# Patient Record
Sex: MALE | Race: BLACK OR AFRICAN AMERICAN | Employment: PART TIME | URBAN - METROPOLITAN AREA
[De-identification: names, ages, dates, MRNs, and addresses within clinical notes are randomized per-mention and may not be internally consistent; named-entity substitution may affect disease eponyms.]

---

## 2017-07-21 ENCOUNTER — OP HISTORICAL/CONVERTED ENCOUNTER (OUTPATIENT)
Dept: OTHER | Age: 67
End: 2017-07-21

## 2019-08-07 ENCOUNTER — OP HISTORICAL/CONVERTED ENCOUNTER (OUTPATIENT)
Dept: OTHER | Age: 69
End: 2019-08-07

## 2019-10-31 ENCOUNTER — OP HISTORICAL/CONVERTED ENCOUNTER (OUTPATIENT)
Dept: OTHER | Age: 69
End: 2019-10-31

## 2019-11-18 ENCOUNTER — OP HISTORICAL/CONVERTED ENCOUNTER (OUTPATIENT)
Dept: OTHER | Age: 69
End: 2019-11-18

## 2020-01-15 ENCOUNTER — OP HISTORICAL/CONVERTED ENCOUNTER (OUTPATIENT)
Dept: OTHER | Age: 70
End: 2020-01-15

## 2020-02-01 ENCOUNTER — OP HISTORICAL/CONVERTED ENCOUNTER (OUTPATIENT)
Dept: OTHER | Age: 70
End: 2020-02-01

## 2020-10-29 VITALS
DIASTOLIC BLOOD PRESSURE: 78 MMHG | OXYGEN SATURATION: 97 % | RESPIRATION RATE: 18 BRPM | HEIGHT: 69 IN | WEIGHT: 160 LBS | HEART RATE: 68 BPM | BODY MASS INDEX: 23.7 KG/M2 | SYSTOLIC BLOOD PRESSURE: 120 MMHG

## 2020-10-29 PROBLEM — R13.10 DYSPHAGIA: Status: ACTIVE | Noted: 2020-01-07

## 2020-10-29 PROBLEM — J31.2 CHRONIC PHARYNGITIS: Status: ACTIVE | Noted: 2020-01-07

## 2020-10-29 PROBLEM — H61.20 IMPACTED CERUMEN: Status: ACTIVE | Noted: 2020-01-07

## 2020-10-29 PROBLEM — K21.9 LARYNGOPHARYNGEAL REFLUX: Status: ACTIVE | Noted: 2020-01-07

## 2020-10-29 PROBLEM — F17.200 SMOKER: Status: ACTIVE | Noted: 2020-01-07

## 2020-10-29 RX ORDER — TRIAMTERENE/HYDROCHLOROTHIAZID 37.5-25 MG
TABLET ORAL DAILY
COMMUNITY

## 2020-10-29 RX ORDER — OMEPRAZOLE 40 MG/1
40 CAPSULE, DELAYED RELEASE ORAL DAILY
COMMUNITY

## 2020-10-29 RX ORDER — SUCRALFATE 1 G/1
1 TABLET ORAL 4 TIMES DAILY
COMMUNITY

## 2020-11-02 ENCOUNTER — TELEPHONE (OUTPATIENT)
Dept: ENT CLINIC | Age: 70
End: 2020-11-02

## 2020-11-03 ENCOUNTER — OFFICE VISIT (OUTPATIENT)
Dept: ENT CLINIC | Age: 70
End: 2020-11-03
Payer: MEDICARE

## 2020-11-03 VITALS
HEIGHT: 69 IN | SYSTOLIC BLOOD PRESSURE: 130 MMHG | HEART RATE: 77 BPM | DIASTOLIC BLOOD PRESSURE: 78 MMHG | WEIGHT: 160 LBS | RESPIRATION RATE: 18 BRPM | BODY MASS INDEX: 23.7 KG/M2 | TEMPERATURE: 97.1 F | OXYGEN SATURATION: 98 %

## 2020-11-03 DIAGNOSIS — H61.002 CHONDRODERMATITIS NODULARIS CHRONICA HELICIS, LEFT: ICD-10-CM

## 2020-11-03 DIAGNOSIS — H61.23 BILATERAL IMPACTED CERUMEN: ICD-10-CM

## 2020-11-03 DIAGNOSIS — K21.9 LARYNGOPHARYNGEAL REFLUX: Primary | ICD-10-CM

## 2020-11-03 DIAGNOSIS — F17.200 SMOKER: ICD-10-CM

## 2020-11-03 PROCEDURE — 99214 OFFICE O/P EST MOD 30 MIN: CPT | Performed by: OTOLARYNGOLOGY

## 2020-11-03 PROCEDURE — 69210 REMOVE IMPACTED EAR WAX UNI: CPT | Performed by: OTOLARYNGOLOGY

## 2020-11-03 RX ORDER — CEPHALEXIN 500 MG/1
500 CAPSULE ORAL 2 TIMES DAILY
Qty: 14 CAP | Refills: 1 | Status: SHIPPED | OUTPATIENT
Start: 2020-11-03 | End: 2020-12-01 | Stop reason: SDUPTHER

## 2020-11-03 NOTE — PROGRESS NOTES
Subjective:    Boy Goldman   77 y.o.   1950     HPI     Location - left ear    Quality - pain, swelling    Severity -  Mild/moderate    Duration - few weeks    Timing - ongoing    Context - pt with known LPR issues, follows today with few weeks of left ear \"knot\", had tried cream with Dr. Graff Hy was not helping, also c/o some irritation right ear as well but not as much    Modifying Features - cream didn't    Associated symptoms/signs - throat irritation, GERD      Review of Systems  Review of Systems   Constitutional: Negative for chills and fever. HENT: Positive for ear pain and sore throat. Negative for hearing loss, nosebleeds and tinnitus. Eyes: Negative for blurred vision and double vision. Respiratory: Negative for cough, sputum production and shortness of breath. Cardiovascular: Negative for chest pain and palpitations. Gastrointestinal: Positive for heartburn. Negative for nausea and vomiting. Musculoskeletal: Negative for joint pain and neck pain. Skin: Negative. Neurological: Negative for dizziness, speech change, weakness and headaches. Endo/Heme/Allergies: Negative for environmental allergies. Does not bruise/bleed easily. Psychiatric/Behavioral: Negative for memory loss. The patient does not have insomnia. Past Medical History:   Diagnosis Date    Chronic pharyngitis 1/7/2020    Dysphagia 1/7/2020    Hypertension     Impacted cerumen 1/7/2020    Laryngopharyngeal reflux 1/7/2020    Sinus problem     Smoker 1/7/2020     Prior to Admission medications    Medication Sig Start Date End Date Taking? Authorizing Provider   cephALEXin (KEFLEX) 500 mg capsule Take 1 Cap by mouth two (2) times a day. 11/3/20  Yes oMrelia Tavares MD   omeprazole (PRILOSEC) 40 mg capsule Take 40 mg by mouth daily. Provider, Historical   triamterene-hydroCHLOROthiazide (MAXZIDE) 37.5-25 mg per tablet Take  by mouth daily.     Provider, Historical   sucralfate (CARAFATE) 1 gram tablet Take 1 g by mouth four (4) times daily. Provider, Historical            Objective:     Visit Vitals  /78 (BP 1 Location: Left arm, BP Patient Position: Sitting)   Pulse 77   Temp 97.1 °F (36.2 °C) (Oral)   Resp 18   Ht 5' 9\" (1.753 m)   Wt 160 lb (72.6 kg)   SpO2 98%   BMI 23.63 kg/m²        Physical Exam  Vitals signs reviewed. Constitutional:       General: He is awake. Appearance: Normal appearance. He is normal weight. HENT:      Head: Normocephalic and atraumatic. Jaw: There is normal jaw occlusion. No trismus, tenderness or malocclusion. Salivary Glands: Right salivary gland is not diffusely enlarged or tender. Left salivary gland is not diffusely enlarged or tender. Right Ear: Tympanic membrane, ear canal and external ear normal. Decreased hearing noted. There is impacted cerumen. Left Ear: Tympanic membrane, ear canal and external ear normal. Decreased hearing noted. There is impacted cerumen. Ears:      Sosa exam findings: does not lateralize. Right Rinne: AC > BC. Left Rinne: AC > BC. Comments: Upper helix - chondrodermatitis with some mild sebaceous drainage; mild auricular swelling      Procedure - Removal Impacted Cerumen    Indications: cerumen impaction    Ears are examined under microscope/headlight.  bilateral ears are cleaned using otologic curette, suction, and/or alligator forceps. Nose: Rhinorrhea present. No septal deviation or mucosal edema. Right Turbinates: Not enlarged, swollen or pale. Left Turbinates: Not enlarged, swollen or pale. Right Sinus: No maxillary sinus tenderness or frontal sinus tenderness. Left Sinus: No maxillary sinus tenderness or frontal sinus tenderness. Mouth/Throat:      Lips: Pink. Mouth: Mucous membranes are moist. No oral lesions. Dentition: Normal dentition. No gum lesions. Tongue: No lesions. Palate: No mass and lesions. Pharynx: Oropharynx is clear. Uvula midline. Tonsils: No tonsillar exudate. 0 on the right. 0 on the left. Eyes:      General: Vision grossly intact. Extraocular Movements: Extraocular movements intact. Right eye: No nystagmus. Left eye: No nystagmus. Pupils: Pupils are equal, round, and reactive to light. Neck:      Musculoskeletal: Normal range of motion. No edema or erythema. Thyroid: No thyroid mass, thyromegaly or thyroid tenderness. Trachea: Trachea and phonation normal. No tracheal tenderness. Cardiovascular:      Rate and Rhythm: Normal rate and regular rhythm. Pulmonary:      Effort: Pulmonary effort is normal.      Breath sounds: Normal breath sounds. No stridor. No wheezing. Musculoskeletal: Normal range of motion. Lymphadenopathy:      Cervical: No cervical adenopathy. Skin:     General: Skin is warm and dry. Neurological:      General: No focal deficit present. Mental Status: He is alert and oriented to person, place, and time. Mental status is at baseline. Cranial Nerves: Cranial nerves are intact. Coordination: Romberg sign negative. Gait: Gait is intact. Comments: Negative Hallpike   Psychiatric:         Mood and Affect: Mood normal.         Behavior: Behavior normal. Behavior is cooperative. Assessment/Plan:     Encounter Diagnoses   Name Primary? 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     Orders Placed This Encounter    cephALEXin (KEFLEX) 500 mg capsule

## 2020-11-03 NOTE — LETTER
11/3/20 Patient: Consuelo Menezes YOB: 1950 Date of Visit: 11/3/2020 Laci Bran MD 
[address] [address] VIA Facsimile: [phone] Dear Laci Bran MD, Thank you for referring Mr. Abrahan Matthews to [facility] EAR, NOSE, THROAT AND ALLERGY CARE for evaluation. My notes for this consultation are attached. If you have questions, please do not hesitate to call me. I look forward to following your patient along with you. Sincerely, John Carter MD

## 2020-11-24 ENCOUNTER — OFFICE VISIT (OUTPATIENT)
Dept: ENT CLINIC | Age: 70
End: 2020-11-24
Payer: MEDICARE

## 2020-11-24 VITALS
WEIGHT: 160 LBS | HEART RATE: 71 BPM | HEIGHT: 69 IN | RESPIRATION RATE: 18 BRPM | TEMPERATURE: 97.5 F | BODY MASS INDEX: 23.7 KG/M2 | OXYGEN SATURATION: 98 % | SYSTOLIC BLOOD PRESSURE: 120 MMHG | DIASTOLIC BLOOD PRESSURE: 78 MMHG

## 2020-11-24 DIAGNOSIS — H61.002 CHONDRODERMATITIS NODULARIS CHRONICA HELICIS, LEFT: Primary | ICD-10-CM

## 2020-11-24 PROCEDURE — 11441 EXC FACE-MM B9+MARG 0.6-1 CM: CPT | Performed by: OTOLARYNGOLOGY

## 2020-11-24 NOTE — PROGRESS NOTES
Excision Benign Neoplasm of Skin, Auricle with Simple Closure    Informed consent was obtained. The area surrounding the left helical skin lesion was cleansed with alcohol then injected with 1.5 mL of 1% lidocaine with 1:100,000 parts epinephrine. We then prepped the area with betadine and draped in sterile fashion. A 15 blade was used to incise the skin surrounding the lesion with gross margins, down to the cartilage level. Scalpel is used to scrape any chondrodermatitis off the cartilage. The excision/defect is   1 cm. We closed the defect primarily with a single layer of running 5-0 fast absorbing plain gut. The wound was cleaned and Steri-strips and sterile pressure dressing was applied.

## 2020-11-24 NOTE — LETTER
11/24/20 Patient: Naseem Savage YOB: 1950 Date of Visit: 11/24/2020 Jennifer Farrar MD 
[address] [address] VIA Facsimile: [phone] Dear Jennifer Farrar MD, Thank you for referring Mr. Filipe Gomez to [facility] EAR, NOSE, THROAT AND ALLERGY CARE for evaluation. My notes for this consultation are attached. If you have questions, please do not hesitate to call me. I look forward to following your patient along with you. Sincerely, Sky Germain MD

## 2020-12-01 ENCOUNTER — TELEPHONE (OUTPATIENT)
Dept: ENT CLINIC | Age: 70
End: 2020-12-01

## 2020-12-01 RX ORDER — CEPHALEXIN 500 MG/1
500 CAPSULE ORAL 2 TIMES DAILY
Qty: 14 CAP | Refills: 1 | Status: SHIPPED | OUTPATIENT
Start: 2020-12-01

## 2020-12-07 LAB
CPT CODES, 490044: NORMAL
CPT DISCLAIMER: NORMAL
CYTOLOGY SPEC DOC CYTO: NORMAL
DIAGNOSIS SYNOPSIS:: NORMAL
DX ICD CODE: NORMAL
DX ICD CODE: NORMAL
PATH REPORT.GROSS SPEC: NORMAL
PATH REPORT.RELEVANT HX SPEC: NORMAL
PATHOLOGIST NAME: NORMAL
PDF IMAGE, 807507: NORMAL
SPECIMEN SOURCE: NORMAL

## 2020-12-14 ENCOUNTER — OFFICE VISIT (OUTPATIENT)
Dept: ENT CLINIC | Age: 70
End: 2020-12-14
Payer: MEDICARE

## 2020-12-14 VITALS
RESPIRATION RATE: 17 BRPM | SYSTOLIC BLOOD PRESSURE: 132 MMHG | HEIGHT: 69 IN | OXYGEN SATURATION: 98 % | HEART RATE: 72 BPM | BODY MASS INDEX: 23.7 KG/M2 | TEMPERATURE: 96.9 F | DIASTOLIC BLOOD PRESSURE: 80 MMHG | WEIGHT: 160 LBS

## 2020-12-14 DIAGNOSIS — F17.200 SMOKER: ICD-10-CM

## 2020-12-14 DIAGNOSIS — K21.9 LARYNGOPHARYNGEAL REFLUX: ICD-10-CM

## 2020-12-14 DIAGNOSIS — H61.22 IMPACTED CERUMEN OF LEFT EAR: ICD-10-CM

## 2020-12-14 DIAGNOSIS — J31.2 CHRONIC PHARYNGITIS: Primary | ICD-10-CM

## 2020-12-14 DIAGNOSIS — H61.002 CHONDRODERMATITIS NODULARIS CHRONICA HELICIS, LEFT: ICD-10-CM

## 2020-12-14 PROCEDURE — 69210 REMOVE IMPACTED EAR WAX UNI: CPT | Performed by: OTOLARYNGOLOGY

## 2020-12-14 NOTE — LETTER
12/14/20 Patient: En De Leon YOB: 1950 Date of Visit: 12/14/2020 Tracey Morrell MD 
[address] [address] VIA Facsimile: [phone] Dear Tracey Morrell MD, Thank you for referring Mr. Jian Green to [facility] EAR, NOSE, THROAT AND ALLERGY CARE for evaluation. My notes for this consultation are attached. If you have questions, please do not hesitate to call me. I look forward to following your patient along with you. Sincerely, Dandre Cobb MD

## 2020-12-14 NOTE — PROGRESS NOTES
Subjective:    Anitha Akins   72 y.o.   1950     Follow-up   Pertinent negatives include no chest pain, no headaches and no shortness of breath. Location - left ear    Quality - pain, swelling    Severity -  Mild/moderate    Duration - few weeks    Timing - ongoing    Context - pt with known LPR issues, follows today with few weeks of left ear \"knot\", had tried cream with Dr. Alonso Eis was not helping, also c/o some irritation right ear as well but not as much    Modifying Features - cream didn't    Associated symptoms/signs - throat irritation, GERD    12/14/20 - 1 mo f/u pt is doing better after ear CNH excision; he is doing better; still c/o throat mucus in morning, no pain, using PPI daily, still smokes; some cough, some PND      Review of Systems  Review of Systems   Constitutional: Negative for chills and fever. HENT: Positive for ear pain and sore throat. Negative for hearing loss, nosebleeds and tinnitus. Eyes: Negative for blurred vision and double vision. Respiratory: Negative for cough, sputum production and shortness of breath. Cardiovascular: Negative for chest pain and palpitations. Gastrointestinal: Positive for heartburn. Negative for nausea and vomiting. Musculoskeletal: Negative for joint pain and neck pain. Skin: Negative. Neurological: Negative for dizziness, speech change, weakness and headaches. Endo/Heme/Allergies: Negative for environmental allergies. Does not bruise/bleed easily. Psychiatric/Behavioral: Negative for memory loss. The patient does not have insomnia. Past Medical History:   Diagnosis Date    Chronic pharyngitis 1/7/2020    Dysphagia 1/7/2020    Hypertension     Impacted cerumen 1/7/2020    Laryngopharyngeal reflux 1/7/2020    Sinus problem     Smoker 1/7/2020     Prior to Admission medications    Medication Sig Start Date End Date Taking?  Authorizing Provider   cephALEXin (KEFLEX) 500 mg capsule Take 1 Cap by mouth two (2) times a day. 12/1/20   Tino Williamson MD   omeprazole (PRILOSEC) 40 mg capsule Take 40 mg by mouth daily. Provider, Historical   triamterene-hydroCHLOROthiazide (MAXZIDE) 37.5-25 mg per tablet Take  by mouth daily. Provider, Historical   sucralfate (CARAFATE) 1 gram tablet Take 1 g by mouth four (4) times daily. Provider, Historical            Objective:     Visit Vitals  /80 (BP 1 Location: Left arm, BP Patient Position: Sitting)   Pulse 72   Temp 96.9 °F (36.1 °C) (Oral)   Resp 17   Ht 5' 9\" (1.753 m)   Wt 160 lb (72.6 kg)   SpO2 98%   BMI 23.63 kg/m²        Physical Exam  Vitals signs reviewed. Constitutional:       General: He is awake. Appearance: Normal appearance. He is normal weight. HENT:      Head: Normocephalic and atraumatic. Jaw: There is normal jaw occlusion. No trismus, tenderness or malocclusion. Salivary Glands: Right salivary gland is not diffusely enlarged or tender. Left salivary gland is not diffusely enlarged or tender. Right Ear: Tympanic membrane, ear canal and external ear normal. Decreased hearing noted. Left Ear: Tympanic membrane, ear canal and external ear normal. Decreased hearing noted. There is impacted cerumen. Ears:      Sosa exam findings: does not lateralize. Right Rinne: AC > BC. Left Rinne: AC > BC. Comments: Upper helix - well healed excision site      Procedure - Removal Impacted Cerumen    Indications: cerumen impaction    Ears are examined under microscope/headlight. Left ears are cleaned using otologic curette, suction, and/or alligator forceps. Nose: Rhinorrhea present. No septal deviation or mucosal edema. Right Turbinates: Not enlarged, swollen or pale. Left Turbinates: Not enlarged, swollen or pale. Right Sinus: No maxillary sinus tenderness or frontal sinus tenderness. Left Sinus: No maxillary sinus tenderness or frontal sinus tenderness. Mouth/Throat:      Lips: Pink.       Mouth: Mucous membranes are moist. No oral lesions. Dentition: Normal dentition. No gum lesions. Tongue: No lesions. Palate: No mass and lesions. Pharynx: Oropharynx is clear. Uvula midline. Tonsils: No tonsillar exudate. 0 on the right. 0 on the left. Eyes:      General: Vision grossly intact. Extraocular Movements: Extraocular movements intact. Right eye: No nystagmus. Left eye: No nystagmus. Pupils: Pupils are equal, round, and reactive to light. Neck:      Musculoskeletal: Normal range of motion. No edema or erythema. Thyroid: No thyroid mass, thyromegaly or thyroid tenderness. Trachea: Trachea and phonation normal. No tracheal tenderness. Cardiovascular:      Rate and Rhythm: Normal rate and regular rhythm. Pulmonary:      Effort: Pulmonary effort is normal.      Breath sounds: Normal breath sounds. No stridor. No wheezing. Musculoskeletal: Normal range of motion. Lymphadenopathy:      Cervical: No cervical adenopathy. Skin:     General: Skin is warm and dry. Neurological:      General: No focal deficit present. Mental Status: He is alert and oriented to person, place, and time. Mental status is at baseline. Cranial Nerves: Cranial nerves are intact. Coordination: Romberg sign negative. Gait: Gait is intact. Comments: Negative Hallpike   Psychiatric:         Mood and Affect: Mood normal.         Behavior: Behavior normal. Behavior is cooperative. Assessment/Plan:     Encounter Diagnoses   Name Primary?  Chronic pharyngitis Yes    Laryngopharyngeal reflux     Chondrodermatitis nodularis chronica helicis, left     Impacted cerumen of left ear     Smoker      Stable LPR - ok to use his PPI prn  Smoking cessation discussed  Ears cleaned AS  Left ear has CNH s/p excision, path benign - healed well  Fu 6 mos       No orders of the defined types were placed in this encounter.     Follow-up and Dispositions · Return in about 6 months (around 6/14/2021).

## 2021-07-14 ENCOUNTER — OFFICE VISIT (OUTPATIENT)
Dept: ENT CLINIC | Age: 71
End: 2021-07-14
Payer: MEDICARE

## 2021-07-14 VITALS
DIASTOLIC BLOOD PRESSURE: 78 MMHG | HEIGHT: 69 IN | OXYGEN SATURATION: 98 % | WEIGHT: 160 LBS | BODY MASS INDEX: 23.7 KG/M2 | SYSTOLIC BLOOD PRESSURE: 120 MMHG | HEART RATE: 87 BPM | RESPIRATION RATE: 18 BRPM | TEMPERATURE: 97.2 F

## 2021-07-14 DIAGNOSIS — H61.23 BILATERAL IMPACTED CERUMEN: ICD-10-CM

## 2021-07-14 DIAGNOSIS — K21.9 LARYNGOPHARYNGEAL REFLUX: ICD-10-CM

## 2021-07-14 DIAGNOSIS — J31.2 CHRONIC PHARYNGITIS: Primary | ICD-10-CM

## 2021-07-14 DIAGNOSIS — F17.200 SMOKER: ICD-10-CM

## 2021-07-14 PROCEDURE — G8536 NO DOC ELDER MAL SCRN: HCPCS | Performed by: OTOLARYNGOLOGY

## 2021-07-14 PROCEDURE — 99213 OFFICE O/P EST LOW 20 MIN: CPT | Performed by: OTOLARYNGOLOGY

## 2021-07-14 PROCEDURE — 3017F COLORECTAL CA SCREEN DOC REV: CPT | Performed by: OTOLARYNGOLOGY

## 2021-07-14 PROCEDURE — 69210 REMOVE IMPACTED EAR WAX UNI: CPT | Performed by: OTOLARYNGOLOGY

## 2021-07-14 PROCEDURE — G8510 SCR DEP NEG, NO PLAN REQD: HCPCS | Performed by: OTOLARYNGOLOGY

## 2021-07-14 PROCEDURE — G8420 CALC BMI NORM PARAMETERS: HCPCS | Performed by: OTOLARYNGOLOGY

## 2021-07-14 PROCEDURE — 1101F PT FALLS ASSESS-DOCD LE1/YR: CPT | Performed by: OTOLARYNGOLOGY

## 2021-07-14 PROCEDURE — G8427 DOCREV CUR MEDS BY ELIG CLIN: HCPCS | Performed by: OTOLARYNGOLOGY

## 2021-07-14 RX ORDER — COLCHICINE 0.6 MG/1
TABLET ORAL
COMMUNITY
Start: 2021-06-20

## 2021-07-14 RX ORDER — TAMSULOSIN HYDROCHLORIDE 0.4 MG/1
CAPSULE ORAL
COMMUNITY

## 2021-07-14 RX ORDER — ALLOPURINOL 300 MG/1
TABLET ORAL
COMMUNITY
Start: 2021-07-06

## 2021-07-14 RX ORDER — DOXYCYCLINE 100 MG/1
CAPSULE ORAL
COMMUNITY
Start: 2021-05-07

## 2021-07-14 NOTE — PROGRESS NOTES
Visit Vitals  /78 (BP 1 Location: Left upper arm, BP Patient Position: Sitting, BP Cuff Size: Adult)   Pulse 87   Temp 97.2 °F (36.2 °C) (Temporal)   Resp 18   Ht 5' 9\" (1.753 m)   Wt 160 lb (72.6 kg)   SpO2 98%   BMI 23.63 kg/m²     Chief Complaint   Patient presents with    Follow-up     Chondrodermatitis nodularis chronica helicis, left

## 2021-07-14 NOTE — PROGRESS NOTES
Subjective:    Allyson Cuadra   72 y.o.   1950     Followup visit    Initial HPI  Location - left ear    Quality - pain, swelling    Severity -  Mild/moderate    Duration - few weeks    Timing - ongoing    Context - pt with known LPR issues, follows today with few weeks of left ear \"knot\", had tried cream with Dr. Roopa Howell was not helping, also c/o some irritation right ear as well but not as much    Modifying Features - cream didn't    Associated symptoms/signs - throat irritation, GERD    Followup HPI  12/14/20 - 1 mo f/u pt is doing better after ear CNH excision; he is doing better; still c/o throat mucus in morning, no pain, using PPI daily, still smokes; some cough, some PND    7/14/21 - 7 mos f/u pt still c/o mucus in the morning; only using PPI prn, not c/o much LPR; does endorse some PND; does c/o effortful swallow at times      Review of Systems  Review of Systems   Constitutional: Negative for chills and fever. HENT: Positive for ear pain and sore throat. Negative for hearing loss, nosebleeds and tinnitus. Eyes: Negative for blurred vision and double vision. Respiratory: Negative for cough, sputum production and shortness of breath. Cardiovascular: Negative for chest pain and palpitations. Gastrointestinal: Positive for heartburn. Negative for nausea and vomiting. Musculoskeletal: Negative for joint pain and neck pain. Skin: Negative. Neurological: Negative for dizziness, speech change, weakness and headaches. Endo/Heme/Allergies: Negative for environmental allergies. Does not bruise/bleed easily. Psychiatric/Behavioral: Negative for memory loss. The patient does not have insomnia.           Past Medical History:   Diagnosis Date    Chronic pharyngitis 1/7/2020    Dysphagia 1/7/2020    Hypertension     Impacted cerumen 1/7/2020    Laryngopharyngeal reflux 1/7/2020    Sinus problem     Smoker 1/7/2020     Prior to Admission medications    Medication Sig Start Date End Date Taking? Authorizing Provider   allopurinoL (ZYLOPRIM) 300 mg tablet  7/6/21   Provider, Historical   doxycycline (VIBRAMYCIN) 100 mg capsule  5/7/21   Provider, Historical   colchicine 0.6 mg tablet  6/20/21   Provider, Historical   tamsulosin (FLOMAX) 0.4 mg capsule tamsulosin 0.4 mg capsule    Provider, Historical   cephALEXin (KEFLEX) 500 mg capsule Take 1 Cap by mouth two (2) times a day. 12/1/20   Tuan Mccarty MD   omeprazole (PRILOSEC) 40 mg capsule Take 40 mg by mouth daily. Provider, Historical   triamterene-hydroCHLOROthiazide (MAXZIDE) 37.5-25 mg per tablet Take  by mouth daily. Provider, Historical   sucralfate (CARAFATE) 1 gram tablet Take 1 g by mouth four (4) times daily. Provider, Historical            Objective:     Visit Vitals  /78 (BP 1 Location: Left upper arm, BP Patient Position: Sitting, BP Cuff Size: Adult)   Pulse 87   Temp 97.2 °F (36.2 °C) (Temporal)   Resp 18   Ht 5' 9\" (1.753 m)   Wt 160 lb (72.6 kg)   SpO2 98%   BMI 23.63 kg/m²        Physical Exam  Vitals reviewed. Constitutional:       General: He is awake. Appearance: Normal appearance. He is normal weight. HENT:      Head: Normocephalic and atraumatic. Jaw: There is normal jaw occlusion. No trismus, tenderness or malocclusion. Salivary Glands: Right salivary gland is not diffusely enlarged or tender. Left salivary gland is not diffusely enlarged or tender. Right Ear: Tympanic membrane, ear canal and external ear normal. Decreased hearing noted. There is impacted cerumen. Left Ear: Tympanic membrane, ear canal and external ear normal. Decreased hearing noted. There is impacted cerumen. Ears:      Sosa exam findings: does not lateralize. Right Rinne: AC > BC. Left Rinne: AC > BC. Nose: Rhinorrhea present. No septal deviation or mucosal edema. Right Turbinates: Not enlarged, swollen or pale. Left Turbinates: Not enlarged, swollen or pale.       Right Sinus: No maxillary sinus tenderness or frontal sinus tenderness. Left Sinus: No maxillary sinus tenderness or frontal sinus tenderness. Mouth/Throat:      Lips: Pink. Mouth: Mucous membranes are moist. No oral lesions. Dentition: Normal dentition. No gum lesions. Tongue: No lesions. Palate: No mass and lesions. Pharynx: Oropharynx is clear. Uvula midline. Tonsils: No tonsillar exudate. 0 on the right. 0 on the left. Eyes:      General: Vision grossly intact. Extraocular Movements: Extraocular movements intact. Right eye: No nystagmus. Left eye: No nystagmus. Pupils: Pupils are equal, round, and reactive to light. Neck:      Thyroid: No thyroid mass, thyromegaly or thyroid tenderness. Trachea: Trachea and phonation normal. No tracheal tenderness. Cardiovascular:      Rate and Rhythm: Normal rate and regular rhythm. Pulmonary:      Effort: Pulmonary effort is normal.      Breath sounds: Normal breath sounds. No stridor. No wheezing. Musculoskeletal:         General: Normal range of motion. Cervical back: Normal range of motion. No edema or erythema. Lymphadenopathy:      Cervical: No cervical adenopathy. Skin:     General: Skin is warm and dry. Neurological:      General: No focal deficit present. Mental Status: He is alert and oriented to person, place, and time. Mental status is at baseline. Cranial Nerves: Cranial nerves are intact. Coordination: Romberg sign negative. Gait: Gait is intact. Psychiatric:         Mood and Affect: Mood normal.         Behavior: Behavior normal. Behavior is cooperative. Procedure - Removal Impacted Cerumen    Indications: cerumen impaction    Ears are examined under microscope/headlight.  bilateral ears are cleaned using otologic curette, suction, and/or alligator forceps. Assessment/Plan:     Encounter Diagnoses   Name Primary?     Chronic pharyngitis Yes    Laryngopharyngeal reflux     Bilateral impacted cerumen     Smoker      Stable LPR - ok to use his PPI prn  Smoking cessation discussed  Ears cleaned AU    Fu 6 mos       Orders Placed This Encounter    REMOVE IMPACTED EAR WAX    allopurinoL (ZYLOPRIM) 300 mg tablet    doxycycline (VIBRAMYCIN) 100 mg capsule    colchicine 0.6 mg tablet    tamsulosin (FLOMAX) 0.4 mg capsule     Follow-up and Dispositions    · Return in about 6 months (around 1/14/2022).

## 2021-12-09 ENCOUNTER — TELEPHONE (OUTPATIENT)
Dept: ENT CLINIC | Age: 71
End: 2021-12-09

## 2022-03-18 PROBLEM — J31.2 CHRONIC PHARYNGITIS: Status: ACTIVE | Noted: 2020-01-07

## 2022-03-18 PROBLEM — R13.10 DYSPHAGIA: Status: ACTIVE | Noted: 2020-01-07

## 2022-03-19 PROBLEM — H61.002: Status: ACTIVE | Noted: 2020-11-03

## 2022-03-19 PROBLEM — F17.200 SMOKER: Status: ACTIVE | Noted: 2020-01-07

## 2022-03-19 PROBLEM — K21.9 LARYNGOPHARYNGEAL REFLUX: Status: ACTIVE | Noted: 2020-01-07

## 2022-03-20 PROBLEM — H61.20 IMPACTED CERUMEN: Status: ACTIVE | Noted: 2020-01-07

## 2022-07-12 ENCOUNTER — TELEPHONE (OUTPATIENT)
Dept: ENT CLINIC | Age: 72
End: 2022-07-12

## 2022-07-27 ENCOUNTER — TELEPHONE (OUTPATIENT)
Dept: ENT CLINIC | Age: 72
End: 2022-07-27

## 2022-07-27 ENCOUNTER — OFFICE VISIT (OUTPATIENT)
Dept: ENT CLINIC | Age: 72
End: 2022-07-27
Payer: MEDICARE

## 2022-07-27 VITALS
HEIGHT: 69 IN | SYSTOLIC BLOOD PRESSURE: 130 MMHG | HEART RATE: 74 BPM | BODY MASS INDEX: 23.7 KG/M2 | DIASTOLIC BLOOD PRESSURE: 80 MMHG | RESPIRATION RATE: 18 BRPM | WEIGHT: 160 LBS | OXYGEN SATURATION: 99 %

## 2022-07-27 DIAGNOSIS — K21.9 LARYNGOPHARYNGEAL REFLUX: ICD-10-CM

## 2022-07-27 DIAGNOSIS — J31.2 CHRONIC PHARYNGITIS: Primary | ICD-10-CM

## 2022-07-27 DIAGNOSIS — H61.23 BILATERAL IMPACTED CERUMEN: ICD-10-CM

## 2022-07-27 PROCEDURE — G8427 DOCREV CUR MEDS BY ELIG CLIN: HCPCS | Performed by: OTOLARYNGOLOGY

## 2022-07-27 PROCEDURE — 99214 OFFICE O/P EST MOD 30 MIN: CPT | Performed by: OTOLARYNGOLOGY

## 2022-07-27 PROCEDURE — 1123F ACP DISCUSS/DSCN MKR DOCD: CPT | Performed by: OTOLARYNGOLOGY

## 2022-07-27 PROCEDURE — G8420 CALC BMI NORM PARAMETERS: HCPCS | Performed by: OTOLARYNGOLOGY

## 2022-07-27 PROCEDURE — 69210 REMOVE IMPACTED EAR WAX UNI: CPT | Performed by: OTOLARYNGOLOGY

## 2022-07-27 PROCEDURE — G8510 SCR DEP NEG, NO PLAN REQD: HCPCS | Performed by: OTOLARYNGOLOGY

## 2022-07-27 PROCEDURE — G8536 NO DOC ELDER MAL SCRN: HCPCS | Performed by: OTOLARYNGOLOGY

## 2022-07-27 PROCEDURE — 1101F PT FALLS ASSESS-DOCD LE1/YR: CPT | Performed by: OTOLARYNGOLOGY

## 2022-07-27 PROCEDURE — 3017F COLORECTAL CA SCREEN DOC REV: CPT | Performed by: OTOLARYNGOLOGY

## 2022-07-27 NOTE — PROGRESS NOTES
Subjective:    Valente Ballesteros   80 y.o.   1950     Followup visit    Initial HPI  Location - left ear    Quality - pain, swelling    Severity -  Mild/moderate    Duration - few weeks    Timing - ongoing    Context - pt with known LPR issues, follows today with few weeks of left ear \"knot\", had tried cream with Dr. 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 He still is on reflux meds    Review of Systems  Review of Systems   Constitutional:  Negative for chills and fever. HENT:  Positive for ear pain and sore throat. Negative for hearing loss, nosebleeds and tinnitus. Eyes:  Negative for blurred vision and double vision. Respiratory:  Negative for cough, sputum production and shortness of breath. Cardiovascular:  Negative for chest pain and palpitations. Gastrointestinal:  Positive for heartburn. Negative for nausea and vomiting. Musculoskeletal:  Negative for joint pain and neck pain. Skin: Negative. Neurological:  Negative for dizziness, speech change, weakness and headaches. Endo/Heme/Allergies:  Negative for environmental allergies. Does not bruise/bleed easily. Psychiatric/Behavioral:  Negative for memory loss. The patient does not have insomnia.         Past Medical History:   Diagnosis Date    Chronic pharyngitis 1/7/2020    Dysphagia 1/7/2020    Hypertension     Impacted cerumen 1/7/2020    Laryngopharyngeal reflux 1/7/2020    Sinus problem Smoker 1/7/2020     Prior to Admission medications    Medication Sig Start Date End Date Taking? Authorizing Provider   allopurinoL (ZYLOPRIM) 300 mg tablet  7/6/21   Provider, Historical   doxycycline (VIBRAMYCIN) 100 mg capsule  5/7/21   Provider, Historical   colchicine 0.6 mg tablet  6/20/21   Provider, Historical   tamsulosin (FLOMAX) 0.4 mg capsule tamsulosin 0.4 mg capsule    Provider, Historical   cephALEXin (KEFLEX) 500 mg capsule Take 1 Cap by mouth two (2) times a day. 12/1/20   Nishant President, MD   omeprazole (PRILOSEC) 40 mg capsule Take 40 mg by mouth daily. Provider, Historical   triamterene-hydroCHLOROthiazide (MAXZIDE) 37.5-25 mg per tablet Take  by mouth daily. Provider, Historical   sucralfate (CARAFATE) 1 gram tablet Take 1 g by mouth four (4) times daily. Provider, Historical            Objective:     Visit Vitals  /80 (BP 1 Location: Left upper arm, BP Patient Position: Sitting, BP Cuff Size: Adult)   Pulse 74   Resp 18   Ht 5' 9\" (1.753 m)   Wt 160 lb (72.6 kg)   SpO2 99%   BMI 23.63 kg/m²        Physical Exam  Vitals reviewed. Constitutional:       General: He is awake. Appearance: Normal appearance. He is normal weight. HENT:      Head: Normocephalic and atraumatic. Jaw: There is normal jaw occlusion. No trismus, tenderness or malocclusion. Salivary Glands: Right salivary gland is not diffusely enlarged or tender. Left salivary gland is not diffusely enlarged or tender. Right Ear: Tympanic membrane, ear canal and external ear normal. Decreased hearing noted. There is impacted cerumen. Left Ear: Tympanic membrane, ear canal and external ear normal. Decreased hearing noted. There is impacted cerumen. Nose: Rhinorrhea present. No septal deviation or mucosal edema. Right Turbinates: Not enlarged, swollen or pale. Left Turbinates: Not enlarged, swollen or pale. Right Sinus: No maxillary sinus tenderness or frontal sinus tenderness. Left Sinus: No maxillary sinus tenderness or frontal sinus tenderness. Mouth/Throat:      Lips: Pink. Mouth: Mucous membranes are moist. No oral lesions. Dentition: Normal dentition. No gum lesions. Tongue: No lesions. Palate: No mass and lesions. Pharynx: Oropharynx is clear. Uvula midline. Tonsils: No tonsillar exudate. 0 on the right. 0 on the left. Eyes:      General: Vision grossly intact. Extraocular Movements: Extraocular movements intact. Right eye: No nystagmus. Left eye: No nystagmus. Pupils: Pupils are equal, round, and reactive to light. Neck:      Thyroid: No thyroid mass, thyromegaly or thyroid tenderness. Trachea: Trachea and phonation normal. No tracheal tenderness. Cardiovascular:      Rate and Rhythm: Normal rate and regular rhythm. Pulmonary:      Effort: Pulmonary effort is normal.      Breath sounds: Normal breath sounds. No stridor. No wheezing. Musculoskeletal:         General: Normal range of motion. Cervical back: Normal range of motion. No edema or erythema. Lymphadenopathy:      Cervical: No cervical adenopathy. Skin:     General: Skin is warm and dry. Neurological:      General: No focal deficit present. Mental Status: He is alert and oriented to person, place, and time. Mental status is at baseline. Cranial Nerves: Cranial nerves are intact. Coordination: Romberg sign negative. Gait: Gait is intact. Psychiatric:         Mood and Affect: Mood normal.         Behavior: Behavior normal. Behavior is cooperative. Procedure - Removal Impacted Cerumen    Indications: cerumen impaction    Ears are examined under microscope/headlight.  bilateral ears are cleaned using otologic curette, suction, and/or alligator forceps. Assessment/Plan:     Encounter Diagnoses   Name Primary?     Chronic pharyngitis Yes    Laryngopharyngeal reflux     Bilateral impacted cerumen      Stable LPR - ok to use his PPI prn  Smoking cessation discussed  Ears cleaned AU    Fu 6 mos       Orders Placed This Encounter    REMOVE IMPACTED EAR WAX       Follow-up and Dispositions    Return in about 6 months (around 1/27/2023).

## 2022-07-27 NOTE — TELEPHONE ENCOUNTER
Pt called stating he was having sudden hearing loss in his left ear and would like to make an appointment. Where is the best place to schedule this pt?

## 2023-02-07 ENCOUNTER — TRANSCRIBE ORDER (OUTPATIENT)
Dept: SCHEDULING | Age: 73
End: 2023-02-07

## 2023-02-07 DIAGNOSIS — J31.0 CHRONIC RHINITIS: ICD-10-CM

## 2023-02-07 DIAGNOSIS — R05.3 COUGH, PERSISTENT: ICD-10-CM

## 2023-02-07 DIAGNOSIS — K21.9 LARYNGOPHARYNGEAL REFLUX: ICD-10-CM

## 2023-02-07 DIAGNOSIS — Z00.8 OTHER SPECIFIED GENERAL MEDICAL EXAMINATION: Primary | ICD-10-CM

## 2023-02-15 ENCOUNTER — TRANSCRIBE ORDER (OUTPATIENT)
Dept: SCHEDULING | Age: 73
End: 2023-02-15

## 2023-02-15 DIAGNOSIS — R05.3 COUGH, PERSISTENT: Primary | ICD-10-CM

## 2023-02-15 DIAGNOSIS — J31.0 CHRONIC RHINITIS: ICD-10-CM

## 2023-02-15 DIAGNOSIS — K21.9 LARYNGOPHARYNGEAL REFLUX: ICD-10-CM

## 2023-02-24 ENCOUNTER — HOSPITAL ENCOUNTER (OUTPATIENT)
Dept: GENERAL RADIOLOGY | Age: 73
Discharge: HOME | End: 2023-02-24
Payer: MEDICARE

## 2023-02-24 DIAGNOSIS — J31.0 CHRONIC RHINITIS: ICD-10-CM

## 2023-02-24 DIAGNOSIS — K21.9 LARYNGOPHARYNGEAL REFLUX: ICD-10-CM

## 2023-02-24 DIAGNOSIS — R05.3 COUGH, PERSISTENT: ICD-10-CM

## 2023-02-24 PROCEDURE — 74011000250 HC RX REV CODE- 250: Performed by: OTOLARYNGOLOGY

## 2023-02-24 PROCEDURE — 74220 X-RAY XM ESOPHAGUS 1CNTRST: CPT

## 2023-02-24 RX ADMIN — BARIUM SULFATE 100 ML: 0.6 SUSPENSION ORAL at 09:33

## 2023-02-24 RX ADMIN — BARIUM SULFATE 700 MG: 700 TABLET ORAL at 09:33

## 2023-05-17 RX ORDER — CEPHALEXIN 500 MG/1
500 CAPSULE ORAL 2 TIMES DAILY
COMMUNITY
Start: 2020-12-01

## 2023-05-17 RX ORDER — SUCRALFATE 1 G/1
1 TABLET ORAL 4 TIMES DAILY
COMMUNITY

## 2023-05-17 RX ORDER — DOXYCYCLINE HYCLATE 100 MG/1
CAPSULE ORAL
COMMUNITY
Start: 2021-05-07

## 2023-05-17 RX ORDER — COLCHICINE 0.6 MG/1
TABLET ORAL
COMMUNITY
Start: 2021-06-20

## 2023-05-17 RX ORDER — TAMSULOSIN HYDROCHLORIDE 0.4 MG/1
CAPSULE ORAL
COMMUNITY

## 2023-05-17 RX ORDER — OMEPRAZOLE 40 MG/1
40 CAPSULE, DELAYED RELEASE ORAL DAILY
COMMUNITY

## 2023-05-17 RX ORDER — ALLOPURINOL 300 MG/1
TABLET ORAL
COMMUNITY
Start: 2021-07-06

## 2023-05-17 RX ORDER — TRIAMTERENE AND HYDROCHLOROTHIAZIDE 37.5; 25 MG/1; MG/1
TABLET ORAL DAILY
COMMUNITY

## 2023-06-26 ENCOUNTER — HOSPITAL ENCOUNTER (OUTPATIENT)
Facility: HOSPITAL | Age: 73
Setting detail: OUTPATIENT SURGERY
Discharge: HOME | End: 2023-06-26
Attending: INTERNAL MEDICINE | Admitting: INTERNAL MEDICINE
Payer: MEDICARE

## 2023-06-26 ENCOUNTER — ANESTHESIA EVENT (OUTPATIENT)
Facility: HOSPITAL | Age: 73
End: 2023-06-26
Payer: MEDICARE

## 2023-06-26 ENCOUNTER — ANESTHESIA (OUTPATIENT)
Facility: HOSPITAL | Age: 73
End: 2023-06-26
Payer: MEDICARE

## 2023-06-26 VITALS
DIASTOLIC BLOOD PRESSURE: 89 MMHG | SYSTOLIC BLOOD PRESSURE: 121 MMHG | RESPIRATION RATE: 18 BRPM | TEMPERATURE: 97.4 F | HEART RATE: 74 BPM | OXYGEN SATURATION: 98 %

## 2023-06-26 DIAGNOSIS — K44.9 HIATAL HERNIA: ICD-10-CM

## 2023-06-26 DIAGNOSIS — R13.10 DYSPHAGIA, UNSPECIFIED TYPE: ICD-10-CM

## 2023-06-26 DIAGNOSIS — K21.9 GASTROESOPHAGEAL REFLUX DISEASE, UNSPECIFIED WHETHER ESOPHAGITIS PRESENT: ICD-10-CM

## 2023-06-26 DIAGNOSIS — K30 DYSPEPSIA DUE TO DYSMOTILITY: ICD-10-CM

## 2023-06-26 PROCEDURE — 3700000001 HC ADD 15 MINUTES (ANESTHESIA): Performed by: INTERNAL MEDICINE

## 2023-06-26 PROCEDURE — 6360000002 HC RX W HCPCS: Performed by: NURSE ANESTHETIST, CERTIFIED REGISTERED

## 2023-06-26 PROCEDURE — 2580000003 HC RX 258: Performed by: INTERNAL MEDICINE

## 2023-06-26 PROCEDURE — 7100000010 HC PHASE II RECOVERY - FIRST 15 MIN: Performed by: INTERNAL MEDICINE

## 2023-06-26 PROCEDURE — 3600007502: Performed by: INTERNAL MEDICINE

## 2023-06-26 PROCEDURE — 3700000000 HC ANESTHESIA ATTENDED CARE: Performed by: INTERNAL MEDICINE

## 2023-06-26 PROCEDURE — 7100000011 HC PHASE II RECOVERY - ADDTL 15 MIN: Performed by: INTERNAL MEDICINE

## 2023-06-26 PROCEDURE — 88305 TISSUE EXAM BY PATHOLOGIST: CPT

## 2023-06-26 PROCEDURE — 3600007512: Performed by: INTERNAL MEDICINE

## 2023-06-26 PROCEDURE — 2709999900 HC NON-CHARGEABLE SUPPLY: Performed by: INTERNAL MEDICINE

## 2023-06-26 PROCEDURE — 2500000003 HC RX 250 WO HCPCS: Performed by: NURSE ANESTHETIST, CERTIFIED REGISTERED

## 2023-06-26 RX ORDER — SODIUM CHLORIDE, SODIUM LACTATE, POTASSIUM CHLORIDE, CALCIUM CHLORIDE 600; 310; 30; 20 MG/100ML; MG/100ML; MG/100ML; MG/100ML
INJECTION, SOLUTION INTRAVENOUS CONTINUOUS
Status: DISCONTINUED | OUTPATIENT
Start: 2023-06-26 | End: 2023-06-26 | Stop reason: HOSPADM

## 2023-06-26 RX ORDER — GLYCOPYRROLATE 0.2 MG/ML
INJECTION INTRAMUSCULAR; INTRAVENOUS PRN
Status: DISCONTINUED | OUTPATIENT
Start: 2023-06-26 | End: 2023-06-26 | Stop reason: SDUPTHER

## 2023-06-26 RX ORDER — PROPOFOL 10 MG/ML
INJECTION, EMULSION INTRAVENOUS PRN
Status: DISCONTINUED | OUTPATIENT
Start: 2023-06-26 | End: 2023-06-26 | Stop reason: SDUPTHER

## 2023-06-26 RX ORDER — LIDOCAINE HYDROCHLORIDE 20 MG/ML
INJECTION, SOLUTION EPIDURAL; INFILTRATION; INTRACAUDAL; PERINEURAL PRN
Status: DISCONTINUED | OUTPATIENT
Start: 2023-06-26 | End: 2023-06-26 | Stop reason: SDUPTHER

## 2023-06-26 RX ADMIN — PROPOFOL 150 MG: 10 INJECTION, EMULSION INTRAVENOUS at 12:33

## 2023-06-26 RX ADMIN — GLYCOPYRROLATE 0.2 MG: 0.2 INJECTION, SOLUTION INTRAMUSCULAR; INTRAVENOUS at 12:31

## 2023-06-26 RX ADMIN — PROPOFOL 50 MG: 10 INJECTION, EMULSION INTRAVENOUS at 12:35

## 2023-06-26 RX ADMIN — LIDOCAINE HYDROCHLORIDE 100 MG: 20 INJECTION, SOLUTION EPIDURAL; INFILTRATION; INTRACAUDAL; PERINEURAL at 12:33

## 2023-06-26 RX ADMIN — SODIUM CHLORIDE, POTASSIUM CHLORIDE, SODIUM LACTATE AND CALCIUM CHLORIDE: 600; 310; 30; 20 INJECTION, SOLUTION INTRAVENOUS at 10:57

## 2023-06-26 ASSESSMENT — PAIN - FUNCTIONAL ASSESSMENT: PAIN_FUNCTIONAL_ASSESSMENT: NONE - DENIES PAIN

## 2023-06-26 NOTE — ANESTHESIA PRE PROCEDURE
results found for: PHART, PO2ART, NBI2RXR, BYR2WIU, BEART, D8UBYVLZ     Type & Screen (If Applicable):  No results found for: LABABO, LABRH    Drug/Infectious Status (If Applicable):  No results found for: HIV, HEPCAB    COVID-19 Screening (If Applicable): No results found for: COVID19        Anesthesia Evaluation  Patient summary reviewed and Nursing notes reviewed no history of anesthetic complications:   Airway: Mallampati: II  TM distance: >3 FB   Neck ROM: full  Mouth opening: > = 3 FB   Dental: normal exam         Pulmonary:Negative Pulmonary ROS and normal exam  breath sounds clear to auscultation                             Cardiovascular:    (+) hypertension:,         Rhythm: regular  Rate: normal                    Neuro/Psych:   Negative Neuro/Psych ROS              GI/Hepatic/Renal:   (+) GERD:,           Endo/Other:    (+) : arthritis:., .                  ROS comment: Gout Abdominal:              PE comment: Deferred. Vascular: negative vascular ROS. Other Findings:           Anesthesia Plan      MAC and TIVA     ASA 2     (Standard ASA monitors: continuous EKG, BP, HR, pulse oximeter, and capnography.)  Induction: intravenous. continuous noninvasive hemodynamic monitor  MIPS: Prophylactic antiemetics administered. Anesthetic plan and risks discussed with patient (and family, if present. ). Plan discussed with CRNA.     Attending anesthesiologist reviewed and agrees with Joshua Lazar MD   6/26/2023

## 2023-06-26 NOTE — ANESTHESIA POSTPROCEDURE EVALUATION
Department of Anesthesiology  Postprocedure Note    Patient: Priti Cifuentes  MRN: [id number]  YOB: 1950  Date of evaluation: 6/26/2023      Procedure Summary     Date: 06/26/23 Room / Location: [facility] ENDO 02 / [facility] ENDOSCOPY    Anesthesia Start: 5491 Anesthesia Stop: 6289    Procedure: EGD WITH BIOPSY/DILATION (Upper GI Region) Diagnosis:       Gastroesophageal reflux disease, unspecified whether esophagitis present      Hiatal hernia      Dyspepsia due to dysmotility      Dysphagia, unspecified type      (Gastroesophageal reflux disease, unspecified whether esophagitis present [K21.9])      (Hiatal hernia [K44.9])      (Dyspepsia due to dysmotility [K30])      (Dysphagia, unspecified type [R13.10])    Surgeons: Suleiman Scales MD Responsible Provider: Suha Mcgraw MD    Anesthesia Type: MAC ASA Status: 2          Anesthesia Type: MAC    Colleen Phase I: Colleen Score: 10    Colleen Phase II:        Anesthesia Post Evaluation    Patient location during evaluation: bedside  Patient participation: complete - patient participated  Level of consciousness: sleepy but conscious  Pain score: 0  Airway patency: patent  Nausea & Vomiting: no vomiting and no nausea  Complications: no  Cardiovascular status: blood pressure returned to baseline  Respiratory status: acceptable  Hydration status: stable

## 2023-08-07 ENCOUNTER — ANESTHESIA (OUTPATIENT)
Facility: HOSPITAL | Age: 73
End: 2023-08-07
Payer: MEDICARE

## 2023-08-07 ENCOUNTER — HOSPITAL ENCOUNTER (OUTPATIENT)
Facility: HOSPITAL | Age: 73
Setting detail: OUTPATIENT SURGERY
Discharge: HOME | End: 2023-08-07
Attending: INTERNAL MEDICINE | Admitting: INTERNAL MEDICINE
Payer: MEDICARE

## 2023-08-07 ENCOUNTER — ANESTHESIA EVENT (OUTPATIENT)
Facility: HOSPITAL | Age: 73
End: 2023-08-07
Payer: MEDICARE

## 2023-08-07 VITALS
WEIGHT: 195 LBS | TEMPERATURE: 97.2 F | RESPIRATION RATE: 18 BRPM | SYSTOLIC BLOOD PRESSURE: 147 MMHG | BODY MASS INDEX: 27.92 KG/M2 | HEART RATE: 78 BPM | DIASTOLIC BLOOD PRESSURE: 92 MMHG | HEIGHT: 70 IN | OXYGEN SATURATION: 100 %

## 2023-08-07 DIAGNOSIS — R19.4 CHANGE IN BOWEL HABIT: ICD-10-CM

## 2023-08-07 DIAGNOSIS — Z86.010 HX OF COLONIC POLYP: ICD-10-CM

## 2023-08-07 PROCEDURE — 2709999900 HC NON-CHARGEABLE SUPPLY: Performed by: INTERNAL MEDICINE

## 2023-08-07 PROCEDURE — 2580000003 HC RX 258: Performed by: NURSE ANESTHETIST, CERTIFIED REGISTERED

## 2023-08-07 PROCEDURE — 2500000003 HC RX 250 WO HCPCS: Performed by: NURSE ANESTHETIST, CERTIFIED REGISTERED

## 2023-08-07 PROCEDURE — 3700000001 HC ADD 15 MINUTES (ANESTHESIA): Performed by: INTERNAL MEDICINE

## 2023-08-07 PROCEDURE — 6360000002 HC RX W HCPCS: Performed by: NURSE ANESTHETIST, CERTIFIED REGISTERED

## 2023-08-07 PROCEDURE — 88305 TISSUE EXAM BY PATHOLOGIST: CPT

## 2023-08-07 PROCEDURE — 7100000010 HC PHASE II RECOVERY - FIRST 15 MIN: Performed by: INTERNAL MEDICINE

## 2023-08-07 PROCEDURE — 3600007502: Performed by: INTERNAL MEDICINE

## 2023-08-07 PROCEDURE — 2580000003 HC RX 258: Performed by: INTERNAL MEDICINE

## 2023-08-07 PROCEDURE — 3700000000 HC ANESTHESIA ATTENDED CARE: Performed by: INTERNAL MEDICINE

## 2023-08-07 PROCEDURE — 3600007512: Performed by: INTERNAL MEDICINE

## 2023-08-07 PROCEDURE — 7100000011 HC PHASE II RECOVERY - ADDTL 15 MIN: Performed by: INTERNAL MEDICINE

## 2023-08-07 RX ORDER — SODIUM CHLORIDE, SODIUM LACTATE, POTASSIUM CHLORIDE, CALCIUM CHLORIDE 600; 310; 30; 20 MG/100ML; MG/100ML; MG/100ML; MG/100ML
INJECTION, SOLUTION INTRAVENOUS CONTINUOUS PRN
Status: DISCONTINUED | OUTPATIENT
Start: 2023-08-07 | End: 2023-08-07 | Stop reason: SDUPTHER

## 2023-08-07 RX ORDER — SODIUM CHLORIDE, SODIUM LACTATE, POTASSIUM CHLORIDE, CALCIUM CHLORIDE 600; 310; 30; 20 MG/100ML; MG/100ML; MG/100ML; MG/100ML
INJECTION, SOLUTION INTRAVENOUS CONTINUOUS
Status: DISCONTINUED | OUTPATIENT
Start: 2023-08-07 | End: 2023-08-07 | Stop reason: HOSPADM

## 2023-08-07 RX ADMIN — PROPOFOL 100 MG: 10 INJECTION, EMULSION INTRAVENOUS at 11:58

## 2023-08-07 RX ADMIN — SODIUM CHLORIDE, POTASSIUM CHLORIDE, SODIUM LACTATE AND CALCIUM CHLORIDE: 600; 310; 30; 20 INJECTION, SOLUTION INTRAVENOUS at 11:56

## 2023-08-07 RX ADMIN — PROPOFOL 20 MG: 10 INJECTION, EMULSION INTRAVENOUS at 12:00

## 2023-08-07 RX ADMIN — SODIUM CHLORIDE, POTASSIUM CHLORIDE, SODIUM LACTATE AND CALCIUM CHLORIDE: 600; 310; 30; 20 INJECTION, SOLUTION INTRAVENOUS at 10:40

## 2023-08-07 RX ADMIN — LIDOCAINE HYDROCHLORIDE 100 MG: 20 INJECTION, SOLUTION EPIDURAL; INFILTRATION; INTRACAUDAL; PERINEURAL at 11:58

## 2023-08-07 ASSESSMENT — PAIN SCALES - GENERAL: PAINLEVEL_OUTOF10: 0

## 2023-08-07 ASSESSMENT — PAIN - FUNCTIONAL ASSESSMENT: PAIN_FUNCTIONAL_ASSESSMENT: 0-10

## 2023-08-07 NOTE — ANESTHESIA POSTPROCEDURE EVALUATION
Department of Anesthesiology  Postprocedure Note    Patient: Alessia Florence  MRN: [id number]  YOB: 1950  Date of evaluation: 8/7/2023      Procedure Summary     Date: 08/07/23 Room / Location: [facility] ENDO 03 / [facility] ENDOSCOPY    Anesthesia Start: 1156 Anesthesia Stop: 1210    Procedure: COLONOSCOPY DIAGNOSTIC (Lower GI Region) Diagnosis:       Change in bowel habit      Hx of colonic polyp      (Change in bowel habit [R19.4])      (Hx of colonic polyp [Z86.010])    Surgeons: Lucille Goss MD Responsible Provider: Del Brooks MD    Anesthesia Type: MAC ASA Status: 2          Anesthesia Type: MAC    Colleen Phase I: Colleen Score: 10    Colleen Phase II:        Anesthesia Post Evaluation    Patient location during evaluation: bedside  Patient participation: complete - patient participated  Level of consciousness: lethargic  Pain score: 0  Airway patency: patent  Nausea & Vomiting: no nausea and no vomiting  Complications: no  Cardiovascular status: hemodynamically stable  Respiratory status: acceptable  Hydration status: stable  Comments: VSS. Report to RN.  Remains on stretcher  Pain management: adequate

## 2023-10-04 NOTE — TELEPHONE ENCOUNTER
Dr. Darius Flynn office called requesting barium swallow results and pathology reports for this mutual pt. Medical release is in pt's media. Please Advise.      Fax #: [phone]
Faxed records to Dr. Madhuri Bustos office
4 = No assist / stand by assistance

## 2024-02-19 NOTE — PROGRESS NOTES
Phoned patient at [phone]. Patient has a responsible ride and is aware of NPO after midnight. Patient to arrive at 1030am. Patient will accept BP medication in am with sip of water.

## 2024-02-20 ENCOUNTER — HOSPITAL ENCOUNTER (INPATIENT)
Facility: HOSPITAL | Age: 74
LOS: 1 days | Discharge: HOME | DRG: 310 | End: 2024-02-22
Attending: INTERNAL MEDICINE | Admitting: HOSPITALIST
Payer: MEDICARE

## 2024-02-20 ENCOUNTER — APPOINTMENT (OUTPATIENT)
Facility: HOSPITAL | Age: 74
DRG: 310 | End: 2024-02-20
Attending: INTERNAL MEDICINE
Payer: MEDICARE

## 2024-02-20 ENCOUNTER — HOSPITAL ENCOUNTER (EMERGENCY)
Facility: HOSPITAL | Age: 74
Discharge: STILL ADMITTED OR ADMINISTRATIVE | DRG: 310 | End: 2024-02-20
Attending: EMERGENCY MEDICINE
Payer: MEDICARE

## 2024-02-20 ENCOUNTER — ANESTHESIA (OUTPATIENT)
Facility: HOSPITAL | Age: 74
DRG: 310 | End: 2024-02-20
Payer: MEDICARE

## 2024-02-20 ENCOUNTER — ANESTHESIA EVENT (OUTPATIENT)
Facility: HOSPITAL | Age: 74
DRG: 310 | End: 2024-02-20
Payer: MEDICARE

## 2024-02-20 VITALS
SYSTOLIC BLOOD PRESSURE: 153 MMHG | TEMPERATURE: 97.8 F | BODY MASS INDEX: 27.92 KG/M2 | HEART RATE: 84 BPM | WEIGHT: 195 LBS | OXYGEN SATURATION: 100 % | HEIGHT: 70 IN | RESPIRATION RATE: 18 BRPM | DIASTOLIC BLOOD PRESSURE: 113 MMHG

## 2024-02-20 DIAGNOSIS — I48.92 ATRIAL FLUTTER, UNSPECIFIED TYPE (HCC): Primary | ICD-10-CM

## 2024-02-20 DIAGNOSIS — R13.10 DYSPHAGIA, UNSPECIFIED TYPE: ICD-10-CM

## 2024-02-20 DIAGNOSIS — I48.92 ATRIAL FLUTTER WITH CONTROLLED RESPONSE (HCC): ICD-10-CM

## 2024-02-20 PROBLEM — K21.9 LARYNGOPHARYNGEAL REFLUX: Status: ACTIVE | Noted: 2020-01-07

## 2024-02-20 PROBLEM — F17.200 SMOKER: Status: ACTIVE | Noted: 2020-01-07

## 2024-02-20 LAB
ALBUMIN SERPL-MCNC: 3.3 G/DL (ref 3.5–5)
ALBUMIN SERPL-MCNC: 3.8 G/DL (ref 3.5–5)
ALBUMIN/GLOB SERPL: 0.8 (ref 1.1–2.2)
ALBUMIN/GLOB SERPL: 1.1 (ref 1.1–2.2)
ALP SERPL-CCNC: 75 U/L (ref 45–117)
ALP SERPL-CCNC: 90 U/L (ref 45–117)
ALT SERPL-CCNC: 17 U/L (ref 12–78)
ALT SERPL-CCNC: ABNORMAL U/L (ref 12–78)
ANION GAP SERPL CALC-SCNC: 2 MMOL/L (ref 5–15)
ANION GAP SERPL CALC-SCNC: 3 MMOL/L (ref 5–15)
AST SERPL W P-5'-P-CCNC: 29 U/L (ref 15–37)
AST SERPL W P-5'-P-CCNC: ABNORMAL U/L (ref 15–37)
BASOPHILS # BLD: 0 K/UL (ref 0–0.1)
BASOPHILS # BLD: 0 K/UL (ref 0–0.1)
BASOPHILS NFR BLD: 0 % (ref 0–1)
BASOPHILS NFR BLD: 1 % (ref 0–1)
BILIRUB SERPL-MCNC: 0.9 MG/DL (ref 0.2–1)
BILIRUB SERPL-MCNC: 1.2 MG/DL (ref 0.2–1)
BUN SERPL-MCNC: 17 MG/DL (ref 6–20)
BUN SERPL-MCNC: 21 MG/DL (ref 6–20)
BUN/CREAT SERPL: 15 (ref 12–20)
BUN/CREAT SERPL: 15 (ref 12–20)
CA-I BLD-MCNC: 9.6 MG/DL (ref 8.5–10.1)
CA-I BLD-MCNC: 9.7 MG/DL (ref 8.5–10.1)
CHLORIDE SERPL-SCNC: 102 MMOL/L (ref 97–108)
CHLORIDE SERPL-SCNC: 104 MMOL/L (ref 97–108)
CO2 SERPL-SCNC: 26 MMOL/L (ref 21–32)
CO2 SERPL-SCNC: 31 MMOL/L (ref 21–32)
CREAT SERPL-MCNC: 1.1 MG/DL (ref 0.7–1.3)
CREAT SERPL-MCNC: 1.4 MG/DL (ref 0.7–1.3)
DIFFERENTIAL METHOD BLD: ABNORMAL
DIFFERENTIAL METHOD BLD: NORMAL
EOSINOPHIL # BLD: 0 K/UL (ref 0–0.4)
EOSINOPHIL # BLD: 0 K/UL (ref 0–0.4)
EOSINOPHIL NFR BLD: 0 % (ref 0–7)
EOSINOPHIL NFR BLD: 1 % (ref 0–7)
ERYTHROCYTE [DISTWIDTH] IN BLOOD BY AUTOMATED COUNT: 13.9 % (ref 11.5–14.5)
ERYTHROCYTE [DISTWIDTH] IN BLOOD BY AUTOMATED COUNT: 14.3 % (ref 11.5–14.5)
GLOBULIN SER CALC-MCNC: 3.4 G/DL (ref 2–4)
GLOBULIN SER CALC-MCNC: 4.1 G/DL (ref 2–4)
GLUCOSE BLD STRIP.AUTO-MCNC: 140 MG/DL (ref 65–100)
GLUCOSE SERPL-MCNC: 121 MG/DL (ref 65–100)
GLUCOSE SERPL-MCNC: 97 MG/DL (ref 65–100)
HCT VFR BLD AUTO: 41 % (ref 36.6–50.3)
HCT VFR BLD AUTO: 44.5 % (ref 36.6–50.3)
HGB BLD-MCNC: 13.7 G/DL (ref 12.1–17)
HGB BLD-MCNC: 14.4 G/DL (ref 12.1–17)
IMM GRANULOCYTES # BLD AUTO: 0 K/UL (ref 0–0.04)
IMM GRANULOCYTES # BLD AUTO: 0.1 K/UL (ref 0–0.04)
IMM GRANULOCYTES NFR BLD AUTO: 0 % (ref 0–0.5)
IMM GRANULOCYTES NFR BLD AUTO: 0 % (ref 0–0.5)
LYMPHOCYTES # BLD: 1 K/UL (ref 0.8–3.5)
LYMPHOCYTES # BLD: 1.9 K/UL (ref 0.8–3.5)
LYMPHOCYTES NFR BLD: 42 % (ref 12–49)
LYMPHOCYTES NFR BLD: 7 % (ref 12–49)
MCH RBC QN AUTO: 28.7 PG (ref 26–34)
MCH RBC QN AUTO: 30.4 PG (ref 26–34)
MCHC RBC AUTO-ENTMCNC: 32.4 G/DL (ref 30–36.5)
MCHC RBC AUTO-ENTMCNC: 33.4 G/DL (ref 30–36.5)
MCV RBC AUTO: 88.6 FL (ref 80–99)
MCV RBC AUTO: 91.1 FL (ref 80–99)
MONOCYTES # BLD: 0.4 K/UL (ref 0–1)
MONOCYTES # BLD: 1.2 K/UL (ref 0–1)
MONOCYTES NFR BLD: 10 % (ref 5–13)
MONOCYTES NFR BLD: 8 % (ref 5–13)
NEUTS SEG # BLD: 12.1 K/UL (ref 1.8–8)
NEUTS SEG # BLD: 2.1 K/UL (ref 1.8–8)
NEUTS SEG NFR BLD: 46 % (ref 32–75)
NEUTS SEG NFR BLD: 85 % (ref 32–75)
NRBC # BLD: 0 K/UL (ref 0–0.01)
NRBC # BLD: 0 K/UL (ref 0–0.01)
NRBC BLD-RTO: 0 PER 100 WBC
NRBC BLD-RTO: 0 PER 100 WBC
PERFORMED BY:: ABNORMAL
PLATELET # BLD AUTO: 239 K/UL (ref 150–400)
PLATELET # BLD AUTO: 242 K/UL (ref 150–400)
PMV BLD AUTO: 10.7 FL (ref 8.9–12.9)
PMV BLD AUTO: 10.8 FL (ref 8.9–12.9)
POTASSIUM SERPL-SCNC: 4.3 MMOL/L (ref 3.5–5.1)
POTASSIUM SERPL-SCNC: ABNORMAL MMOL/L (ref 3.5–5.1)
PROT SERPL-MCNC: 7.2 G/DL (ref 6.4–8.2)
PROT SERPL-MCNC: 7.4 G/DL (ref 6.4–8.2)
RBC # BLD AUTO: 4.5 M/UL (ref 4.1–5.7)
RBC # BLD AUTO: 5.02 M/UL (ref 4.1–5.7)
SODIUM SERPL-SCNC: 132 MMOL/L (ref 136–145)
SODIUM SERPL-SCNC: 136 MMOL/L (ref 136–145)
TROPONIN I SERPL HS-MCNC: 10 NG/L (ref 0–76)
TROPONIN I SERPL HS-MCNC: 11 NG/L (ref 0–76)
TROPONIN I SERPL HS-MCNC: 7 NG/L (ref 0–76)
TSH SERPL DL<=0.05 MIU/L-ACNC: 1.69 UIU/ML (ref 0.36–3.74)
WBC # BLD AUTO: 14.4 K/UL (ref 4.1–11.1)
WBC # BLD AUTO: 4.6 K/UL (ref 4.1–11.1)

## 2024-02-20 PROCEDURE — 96374 THER/PROPH/DIAG INJ IV PUSH: CPT

## 2024-02-20 PROCEDURE — 82962 GLUCOSE BLOOD TEST: CPT

## 2024-02-20 PROCEDURE — 36415 COLL VENOUS BLD VENIPUNCTURE: CPT

## 2024-02-20 PROCEDURE — G0378 HOSPITAL OBSERVATION PER HR: HCPCS

## 2024-02-20 PROCEDURE — 71045 X-RAY EXAM CHEST 1 VIEW: CPT

## 2024-02-20 PROCEDURE — 2580000003 HC RX 258: Performed by: INTERNAL MEDICINE

## 2024-02-20 PROCEDURE — 80053 COMPREHEN METABOLIC PANEL: CPT

## 2024-02-20 PROCEDURE — 2709999900 HC NON-CHARGEABLE SUPPLY: Performed by: INTERNAL MEDICINE

## 2024-02-20 PROCEDURE — 6370000000 HC RX 637 (ALT 250 FOR IP): Performed by: INTERNAL MEDICINE

## 2024-02-20 PROCEDURE — 6370000000 HC RX 637 (ALT 250 FOR IP): Performed by: STUDENT IN AN ORGANIZED HEALTH CARE EDUCATION/TRAINING PROGRAM

## 2024-02-20 PROCEDURE — 85025 COMPLETE CBC W/AUTO DIFF WBC: CPT

## 2024-02-20 PROCEDURE — 3600007502: Performed by: INTERNAL MEDICINE

## 2024-02-20 PROCEDURE — 96375 TX/PRO/DX INJ NEW DRUG ADDON: CPT

## 2024-02-20 PROCEDURE — 94761 N-INVAS EAR/PLS OXIMETRY MLT: CPT

## 2024-02-20 PROCEDURE — 3600007512: Performed by: INTERNAL MEDICINE

## 2024-02-20 PROCEDURE — 84439 ASSAY OF FREE THYROXINE: CPT

## 2024-02-20 PROCEDURE — 99285 EMERGENCY DEPT VISIT HI MDM: CPT

## 2024-02-20 PROCEDURE — 84484 ASSAY OF TROPONIN QUANT: CPT

## 2024-02-20 PROCEDURE — 6360000002 HC RX W HCPCS: Performed by: EMERGENCY MEDICINE

## 2024-02-20 PROCEDURE — 84443 ASSAY THYROID STIM HORMONE: CPT

## 2024-02-20 PROCEDURE — 2500000003 HC RX 250 WO HCPCS: Performed by: STUDENT IN AN ORGANIZED HEALTH CARE EDUCATION/TRAINING PROGRAM

## 2024-02-20 PROCEDURE — 2580000003 HC RX 258: Performed by: STUDENT IN AN ORGANIZED HEALTH CARE EDUCATION/TRAINING PROGRAM

## 2024-02-20 PROCEDURE — 93005 ELECTROCARDIOGRAM TRACING: CPT | Performed by: NURSE ANESTHETIST, CERTIFIED REGISTERED

## 2024-02-20 RX ORDER — ACETAMINOPHEN 325 MG/1
650 TABLET ORAL EVERY 6 HOURS PRN
Status: DISCONTINUED | OUTPATIENT
Start: 2024-02-20 | End: 2024-02-22 | Stop reason: HOSPADM

## 2024-02-20 RX ORDER — METOPROLOL TARTRATE 1 MG/ML
5 INJECTION, SOLUTION INTRAVENOUS ONCE
Status: COMPLETED | OUTPATIENT
Start: 2024-02-20 | End: 2024-02-20

## 2024-02-20 RX ORDER — PANTOPRAZOLE SODIUM 40 MG/1
40 TABLET, DELAYED RELEASE ORAL
Status: DISCONTINUED | OUTPATIENT
Start: 2024-02-21 | End: 2024-02-22 | Stop reason: HOSPADM

## 2024-02-20 RX ORDER — SODIUM CHLORIDE 0.9 % (FLUSH) 0.9 %
5-40 SYRINGE (ML) INJECTION EVERY 12 HOURS SCHEDULED
Status: DISCONTINUED | OUTPATIENT
Start: 2024-02-20 | End: 2024-02-22 | Stop reason: HOSPADM

## 2024-02-20 RX ORDER — POLYETHYLENE GLYCOL 3350 17 G/17G
17 POWDER, FOR SOLUTION ORAL DAILY PRN
Status: DISCONTINUED | OUTPATIENT
Start: 2024-02-20 | End: 2024-02-22 | Stop reason: HOSPADM

## 2024-02-20 RX ORDER — ONDANSETRON 2 MG/ML
4 INJECTION INTRAMUSCULAR; INTRAVENOUS ONCE
Status: COMPLETED | OUTPATIENT
Start: 2024-02-20 | End: 2024-02-20

## 2024-02-20 RX ORDER — ACETAMINOPHEN 650 MG/1
650 SUPPOSITORY RECTAL EVERY 6 HOURS PRN
Status: DISCONTINUED | OUTPATIENT
Start: 2024-02-20 | End: 2024-02-22 | Stop reason: HOSPADM

## 2024-02-20 RX ORDER — LIDOCAINE HYDROCHLORIDE 20 MG/ML
INJECTION, SOLUTION EPIDURAL; INFILTRATION; INTRACAUDAL; PERINEURAL
Status: DISCONTINUED
Start: 2024-02-20 | End: 2024-02-20

## 2024-02-20 RX ORDER — TAMSULOSIN HYDROCHLORIDE 0.4 MG/1
0.4 CAPSULE ORAL DAILY
Status: DISCONTINUED | OUTPATIENT
Start: 2024-02-20 | End: 2024-02-22 | Stop reason: HOSPADM

## 2024-02-20 RX ORDER — ALLOPURINOL 300 MG/1
300 TABLET ORAL DAILY
Status: DISCONTINUED | OUTPATIENT
Start: 2024-02-20 | End: 2024-02-22 | Stop reason: HOSPADM

## 2024-02-20 RX ORDER — PROCHLORPERAZINE EDISYLATE 5 MG/ML
10 INJECTION INTRAMUSCULAR; INTRAVENOUS EVERY 6 HOURS PRN
Status: DISCONTINUED | OUTPATIENT
Start: 2024-02-20 | End: 2024-02-22 | Stop reason: HOSPADM

## 2024-02-20 RX ORDER — PROPOFOL 10 MG/ML
INJECTION, EMULSION INTRAVENOUS
Status: DISCONTINUED
Start: 2024-02-20 | End: 2024-02-20

## 2024-02-20 RX ORDER — SODIUM CHLORIDE 0.9 % (FLUSH) 0.9 %
5-40 SYRINGE (ML) INJECTION PRN
Status: DISCONTINUED | OUTPATIENT
Start: 2024-02-20 | End: 2024-02-22 | Stop reason: HOSPADM

## 2024-02-20 RX ORDER — COLCHICINE 0.6 MG/1
0.6 TABLET ORAL DAILY
Status: DISCONTINUED | OUTPATIENT
Start: 2024-02-20 | End: 2024-02-22 | Stop reason: HOSPADM

## 2024-02-20 RX ORDER — SUCRALFATE 1 G/1
1 TABLET ORAL
Status: DISCONTINUED | OUTPATIENT
Start: 2024-02-20 | End: 2024-02-22 | Stop reason: HOSPADM

## 2024-02-20 RX ORDER — 0.9 % SODIUM CHLORIDE 0.9 %
25 INTRAVENOUS SOLUTION INTRAVENOUS PRN
Status: DISCONTINUED | OUTPATIENT
Start: 2024-02-20 | End: 2024-02-20 | Stop reason: HOSPADM

## 2024-02-20 RX ORDER — SODIUM CHLORIDE, SODIUM LACTATE, POTASSIUM CHLORIDE, CALCIUM CHLORIDE 600; 310; 30; 20 MG/100ML; MG/100ML; MG/100ML; MG/100ML
INJECTION, SOLUTION INTRAVENOUS PRN
Status: DISCONTINUED | OUTPATIENT
Start: 2024-02-20 | End: 2024-02-20 | Stop reason: HOSPADM

## 2024-02-20 RX ORDER — SODIUM CHLORIDE 9 MG/ML
INJECTION, SOLUTION INTRAVENOUS PRN
Status: DISCONTINUED | OUTPATIENT
Start: 2024-02-20 | End: 2024-02-22 | Stop reason: HOSPADM

## 2024-02-20 RX ORDER — GLYCOPYRROLATE 0.2 MG/ML
INJECTION INTRAMUSCULAR; INTRAVENOUS
Status: DISCONTINUED
Start: 2024-02-20 | End: 2024-02-20

## 2024-02-20 RX ORDER — TRIAMTERENE AND HYDROCHLOROTHIAZIDE 37.5; 25 MG/1; MG/1
1 TABLET ORAL DAILY
Status: DISCONTINUED | OUTPATIENT
Start: 2024-02-21 | End: 2024-02-21

## 2024-02-20 RX ADMIN — SODIUM CHLORIDE, PRESERVATIVE FREE 10 ML: 5 INJECTION INTRAVENOUS at 20:58

## 2024-02-20 RX ADMIN — SUCRALFATE 1 G: 1 TABLET ORAL at 20:58

## 2024-02-20 RX ADMIN — ALLOPURINOL 300 MG: 300 TABLET ORAL at 16:45

## 2024-02-20 RX ADMIN — ONDANSETRON 4 MG: 2 INJECTION INTRAMUSCULAR; INTRAVENOUS at 13:53

## 2024-02-20 RX ADMIN — COLCHICINE 0.6 MG: 0.6 TABLET, FILM COATED ORAL at 16:45

## 2024-02-20 RX ADMIN — DILTIAZEM HYDROCHLORIDE 30 MG: 30 TABLET, FILM COATED ORAL at 16:46

## 2024-02-20 RX ADMIN — SUCRALFATE 1 G: 1 TABLET ORAL at 16:45

## 2024-02-20 RX ADMIN — DILTIAZEM HYDROCHLORIDE 30 MG: 30 TABLET, FILM COATED ORAL at 23:43

## 2024-02-20 RX ADMIN — SODIUM CHLORIDE, POTASSIUM CHLORIDE, SODIUM LACTATE AND CALCIUM CHLORIDE: 600; 310; 30; 20 INJECTION, SOLUTION INTRAVENOUS at 11:22

## 2024-02-20 RX ADMIN — METOPROLOL TARTRATE 5 MG: 5 INJECTION INTRAVENOUS at 18:12

## 2024-02-20 RX ADMIN — TAMSULOSIN HYDROCHLORIDE 0.4 MG: 0.4 CAPSULE ORAL at 16:45

## 2024-02-20 ASSESSMENT — PAIN SCALES - GENERAL
PAINLEVEL_OUTOF10: 0

## 2024-02-20 ASSESSMENT — PAIN - FUNCTIONAL ASSESSMENT
PAIN_FUNCTIONAL_ASSESSMENT: 0-10
PAIN_FUNCTIONAL_ASSESSMENT: 0-10
PAIN_FUNCTIONAL_ASSESSMENT: NONE - DENIES PAIN

## 2024-02-20 ASSESSMENT — LIFESTYLE VARIABLES
HOW OFTEN DO YOU HAVE A DRINK CONTAINING ALCOHOL: NEVER
HOW MANY STANDARD DRINKS CONTAINING ALCOHOL DO YOU HAVE ON A TYPICAL DAY: PATIENT DOES NOT DRINK

## 2024-02-20 ASSESSMENT — PAIN DESCRIPTION - DESCRIPTORS: DESCRIPTORS: ACHING

## 2024-02-20 NOTE — ED NOTES
Perfect serve sent to attending physician per 4W request to add code status. Pt reported to this nurse that  in the event of an emergency he did not want any resuscitation.

## 2024-02-20 NOTE — CONSULTS
Cardiology Consult    NAME: Reid Nelson   :  1950   MRN:  [id number]     Date/Time:  2024 5:14 PM    Patient PCP: Brandt Peterson MD  ________________________________________________________________________     Assessment:     Patient is a 73-year-old -American male with:  1.  Atrial fibrillation/flutter, new diagnosis  2.  Hypertension  3.  Obstructive sleep apnea   4.  Dysphagia  5.  Nicotine dependence            Plan:     Sodium 132, potassium 4.3, creatinine 1.1.  White count is elevated at 14.4.  Chest x-ray was nonacute.    Okay to proceed with EGD  He appears to be euvolemic  Currently on oral Cardizem 30 mg that we will continue admitted to 3 times a day.  Prior to discharge we will plan to switch patient to long-acting Cardizem.    We will plan for outpatient echocardiogram and possible cardioversion      []        High complexity decision making was performed        Subjective:   CHIEF COMPLAINT:   Dysphagia  REASON FOR CONSULT: Atrial fibrillation      HISTORY OF PRESENT ILLNESS:     Reid Nelson is a 73 y.o. Black /  male who has a history of chronic pharyngitis, hypertension, nicotine dependence  Presented for elective upper endoscopy and possible esophageal dilatation.  He was found to be in atrial fibrillation and admitted for rate control.  Denied recent change in his weight or lower extremity swelling  No palpitation, dizziness or syncope.  He snores loud.  Never had any previous cardiac workup    Past Medical History:   Diagnosis Date    Chronic pharyngitis 2020    Dysphagia 2020    Hypertension     Impacted cerumen 2020    Laryngopharyngeal reflux 2020    Sinus problem     Smoker 2020      Past Surgical History:   Procedure Laterality Date    COLONOSCOPY N/A 2023    COLONOSCOPY DIAGNOSTIC performed by Kobi Edawrds MD at [facility] ENDOSCOPY    COLONOSCOPY N/A 2023    COLONOSCOPY WITH BIOPSY performed by Kobi Edwards MD at [facility]  Lymphocytes % 7 (L) 12 - 49 %    Monocytes % 8 5 - 13 %    Eosinophils % 0 0 - 7 %    Basophils % 0 0 - 1 %    Immature Granulocytes 0 0 - 0.5 %    Neutrophils Absolute 12.1 (H) 1.8 - 8.0 K/UL    Lymphocytes Absolute 1.0 0.8 - 3.5 K/UL    Monocytes Absolute 1.2 (H) 0.0 - 1.0 K/UL    Eosinophils Absolute 0.0 0.0 - 0.4 K/UL    Basophils Absolute 0.0 0.0 - 0.1 K/UL    Absolute Immature Granulocyte 0.1 (H) 0.00 - 0.04 K/UL    Differential Type AUTOMATED     Comprehensive Metabolic Panel    Collection Time: 02/20/24  1:55 PM   Result Value Ref Range    Sodium 132 (L) 136 - 145 mmol/L    Potassium Hemolyzed, Recollection Recommended 3.5 - 5.1 mmol/L    Chloride 104 97 - 108 mmol/L    CO2 26 21 - 32 mmol/L    Anion Gap 2 (L) 5 - 15 mmol/L    Glucose 121 (H) 65 - 100 mg/dL    BUN 17 6 - 20 mg/dL    Creatinine 1.10 0.70 - 1.30 mg/dL    Bun/Cre Ratio 15 12 - 20      Est, Glom Filt Rate >60 >60 ml/min/1.73m2    Calcium 9.6 8.5 - 10.1 mg/dL    Total Bilirubin 1.2 (H) 0.2 - 1.0 mg/dL    AST Hemolyzed, Recollection Recommended 15 - 37 U/L    ALT Hemolyzed, Recollection Recommended 12 - 78 U/L    Alk Phosphatase 90 45 - 117 U/L    Total Protein 7.2 6.4 - 8.2 g/dL    Albumin 3.8 3.5 - 5.0 g/dL    Globulin 3.4 2.0 - 4.0 g/dL    Albumin/Globulin Ratio 1.1 1.1 - 2.2     Troponin    Collection Time: 02/20/24  1:55 PM   Result Value Ref Range    Troponin, High Sensitivity 7 0 - 76 ng/L        Electronically signed by    Sylvie Burnett MD  February 20, 2024   5:14 PM

## 2024-02-20 NOTE — PERIOP NOTE
Cancelled procedure per Anesthesia due to new cardiac rhythm in a patient without cardiac history.Dr. Edwards notified. Pt transported to ER Cardiac 2. Report given to Natan ESCAMILLA.

## 2024-02-20 NOTE — PROGRESS NOTES
4 Eyes Skin Assessment     NAME:  Reid Nelson  YOB: 1950  MEDICAL RECORD NUMBER:  [id number]    The patient is being assessed for  Admission    I agree that at least one RN has performed a thorough Head to Toe Skin Assessment on the patient. ALL assessment sites listed below have been assessed.      Areas assessed by both nurses:    Head, Face, Ears, Shoulders, Back, Chest, Arms, Elbows, Hands, Sacrum. Buttock, Coccyx, Ischium, Legs. Feet and Heels, and Under Medical Devices         Does the Patient have a Wound? No noted wound(s)       Isreal Prevention initiated by RN: No  Wound Care Orders initiated by RN: No    Pressure Injury (Stage 3,4, Unstageable, DTI, NWPT, and Complex wounds) if present, place Wound referral order by RN under : No    New Ostomies, if present place, Ostomy referral order under : No     Nurse 1 eSignature: Electronically signed by Shae Mcfadden RN on 2/20/24 at 4:31 PM EST    **SHARE this note so that the co-signing nurse can place an eSignature**    Nurse 2 eSignature: Electronically signed by Josey Saucedo RN on 2/20/24 at 6:23 PM EST

## 2024-02-20 NOTE — ED PROVIDER NOTES
[facility] EMERGENCY DEPT  EMERGENCY DEPARTMENT HISTORY AND PHYSICAL EXAM      Date: 2/20/2024  Patient Name: Reid Nelson  MRN: [id number]  Birthdate 1950  Date of evaluation: 1/30/2024  Provider: Amber Silva MD   Note Started: 1:29 PM EST 2/20/24    HISTORY OF PRESENT ILLNESS     Chief Complaint   Patient presents with    Irregular Heart Beat       History Provided By: Patient    HPI: Reid Nelson is a 73 y.o. male patient was scheduled to have an endoscopy done today but they had to cancel secondary to nausea with new onset atrial flutter.    PAST MEDICAL HISTORY   Past Medical History:  Past Medical History:   Diagnosis Date    Chronic pharyngitis 1/7/2020    Dysphagia 1/7/2020    Hypertension     Impacted cerumen 1/7/2020    Laryngopharyngeal reflux 1/7/2020    Sinus problem     Smoker 1/7/2020       Past Surgical History:  Past Surgical History:   Procedure Laterality Date    COLONOSCOPY N/A 8/7/2023    COLONOSCOPY DIAGNOSTIC performed by Kobi Edwards MD at [facility] ENDOSCOPY    COLONOSCOPY N/A 8/7/2023    COLONOSCOPY WITH BIOPSY performed by Kobi Edwards MD at [facility] ENDOSCOPY    UPPER GASTROINTESTINAL ENDOSCOPY N/A 6/26/2023    EGD WITH BIOPSY/DILATION performed by Kobi Edwards MD at [facility] ENDOSCOPY       Family History:  History reviewed. No pertinent family history.    Social History:  Social History     Tobacco Use    Smoking status: Every Day    Smokeless tobacco: Never   Substance Use Topics    Drug use: Never       Allergies:  No Known Allergies    PCP: Brandt Peterson MD    Current Meds:   No current facility-administered medications for this encounter.     Current Outpatient Medications   Medication Sig Dispense Refill    allopurinol (ZYLOPRIM) 300 MG tablet ceived the following from [facility]: Outside name: allopurinoL (ZYLOPRIM) 300 mg tablet      cephALEXin (KEFLEX) 500 MG capsule Take 1 capsule by mouth 2 times daily (Patient not taking: Reported on 6/26/2023)      colchicine (COLCRYS)  1.8 - 8.0 K/UL    Lymphocytes Absolute 1.9 0.8 - 3.5 K/UL    Monocytes Absolute 0.4 0.0 - 1.0 K/UL    Eosinophils Absolute 0.0 0.0 - 0.4 K/UL    Basophils Absolute 0.0 0.0 - 0.1 K/UL    Absolute Immature Granulocyte 0.0 0.00 - 0.04 K/UL    Differential Type AUTOMATED     Comprehensive Metabolic Panel    Collection Time: 02/20/24 12:37 PM   Result Value Ref Range    Sodium 136 136 - 145 mmol/L    Potassium 4.3 3.5 - 5.1 mmol/L    Chloride 102 97 - 108 mmol/L    CO2 31 21 - 32 mmol/L    Anion Gap 3 (L) 5 - 15 mmol/L    Glucose 97 65 - 100 mg/dL    BUN 21 (H) 6 - 20 mg/dL    Creatinine 1.40 (H) 0.70 - 1.30 mg/dL    Bun/Cre Ratio 15 12 - 20      Est, Glom Filt Rate 53 (L) >60 ml/min/1.73m2    Calcium 9.7 8.5 - 10.1 mg/dL    Total Bilirubin 0.9 0.2 - 1.0 mg/dL    AST 29 15 - 37 U/L    ALT 17 12 - 78 U/L    Alk Phosphatase 75 45 - 117 U/L    Total Protein 7.4 6.4 - 8.2 g/dL    Albumin 3.3 (L) 3.5 - 5.0 g/dL    Globulin 4.1 (H) 2.0 - 4.0 g/dL    Albumin/Globulin Ratio 0.8 (L) 1.1 - 2.2     Troponin    Collection Time: 02/20/24 12:37 PM   Result Value Ref Range    Troponin, High Sensitivity 10 0 - 76 ng/L       EKG:.See ED Course Below    Radiologic Studies:  Non-plain film images such as CT, Ultrasound and MRI are read by the radiologist. Plain radiographic images are visualized and preliminarily interpreted by the ED Provider with the following findings: Not Applicable.    Interpretation per the Radiologist below, if available at the time of this note:  No orders to display    CXR: FINDINGS:      Cardiomediastinal silhouette is within normal limits. Lungs and pleural spaces  are grossly clear. Right hemidiaphragm elevation.     IMPRESSION:     No acute findings.    Chest x-ray interpreted independently by ER physician.  No evidence of pneumothorax, rib fracture, pleural effusion pneumonia or dissection.  Negative acute.      EMERGENCY DEPARTMENT COURSE and DIFFERENTIAL DIAGNOSIS/MDM   CC/HPI Summary, DDx, ED Course, and

## 2024-02-20 NOTE — ANESTHESIA PRE-OP
After hooking patient to monitors in ENDO rm 4, we found patient was in afib/a flutter. Case cancelled. Endo staff is to send patient to ER and get evaluated and consult cardilogy too from there.

## 2024-02-20 NOTE — ED TRIAGE NOTES
Pt brought to ED from endoscopy supposed to be having EGD for difficulty swallowing. Noticed to be in aflutter rate . Pt reports feeling \"sick and dizzy since Friday\" Reports hx of motion sickness

## 2024-02-20 NOTE — H&P
V2.0  History and Physical      Name:  Reid Nelson /Age/Sex: 1950  (73 y.o. male)   MRN & CSN:  [id number] & [id number] Encounter Date/Time: 2024   Location:  [facility] PCP: Brandt Peterson MD       Hospital Day: 1    Assessment and Plan:   Reid Nelson is a 73 y.o. male with a pmh as below who presents with Atrial flutter with controlled response (HCC)    Hospital Problems             Last Modified POA    * (Principal) Atrial flutter with controlled response (HCC) 2024 Yes    Hypertension 2024 Yes    Laryngopharyngeal reflux 2024 Yes    Smoker 2024 Yes       Plan:  Atrial flutter with controlled response (HCC)  4:1  Pt is asymptomatic and hemodynamically stable  Target HR <110 to avoid tachycardia-mediated cardiomyopathy  30 mg diltiazem PO once  Check 2D echocardiogram in AM  Check TSH, FT4, Mg, electrolytes  Cycle cardiac enzymes  Avala2xicz score of 2, possible EGD in am. Hold off on antiplatelet or AC for tonight  Cardiology consultation    Hypertension  Diltiazem as above  Continue home medications    Laryngopharyngeal reflux  EGD likely rescheduled for tomorrow  Continue home medications    Smoker  Recommend cessation      Disposition:   Current Living situation: Home  Expected Disposition: Home  Estimated D/C: 1 D    Diet Diet NPO Exceptions are: Sips of Water with Meds   DVT Prophylaxis [x] Lovenox, []  Heparin, [] SCDs, [] Ambulation,  [] Eliquis, [] Xarelto, [] Coumadin   Code Status DNR     Personally reviewed Lab Studies and Imaging     Drugs that require monitoring for toxicity include lovenox and diltiazem and the method of monitoring was labs and vitals    History from:     patient    History of Present Illness:     Chief Complaint: Nausea  Reid Nelson is a 73 y.o. male with pmh as below who presents from endoscope for A flutter. Patient was scheduled to get a EGD today and prior to that he was found to be in A flutter and sent to the ED. He does have  chronic nausea that he states \"may have been worse over the last 2 days.\" He does endorse GERD. He denies any CP or palpitations. He denies any prior episodes of afib/aflutter. Otherwise he denies any HA, fevers, chills, SOB, cough, abd pain, emesis, diarrhea, or dysuria.     Discussed case with ED provider.      Review of Systems:    Review of Systems   All other systems reviewed and are negative.      Pertinent positives and negatives discussed in HPI     Objective:   No intake or output data in the 24 hours ending 02/20/24 1442   Vitals:   Vitals:    02/20/24 1330 02/20/24 1345 02/20/24 1354 02/20/24 1400   BP: (!) 133/105 (!) 140/105 (!) 140/105 (!) 140/103   Pulse: 93 83 87 99   Resp: 19 15 22 21   Temp:       TempSrc:       SpO2: 97% 98% 98% 98%       Medications Prior to Admission     Prior to Admission medications    Medication Sig Start Date End Date Taking? Authorizing Provider   allopurinol (ZYLOPRIM) 300 MG tablet ceived the following from [facility]: Outside name: allopurinoL (ZYLOPRIM) 300 mg tablet 7/6/21   Automatic Reconciliation, Ar   cephALEXin (KEFLEX) 500 MG capsule Take 1 capsule by mouth 2 times daily  Patient not taking: Reported on 6/26/2023 12/1/20   Automatic Reconciliation, Ar   colchicine (COLCRYS) 0.6 MG tablet ceived the following from [facility]: Outside name: colchicine 0.6 mg tablet 6/20/21   Automatic Reconciliation, Ar   doxycycline hyclate (VIBRAMYCIN) 100 MG capsule ceived the following from [facility]: Outside name: doxycycline (VIBRAMYCIN) 100 mg capsule  Patient not taking: Reported on 6/26/2023 5/7/21   Automatic Reconciliation, Ar   omeprazole (PRILOSEC) 40 MG delayed release capsule Take 1 capsule by mouth daily    Automatic Reconciliation, Ar   sucralfate (CARAFATE) 1 GM tablet Take 1 tablet by mouth 4 times daily    Automatic Reconciliation, Ar   tamsulosin (FLOMAX) 0.4 MG capsule tamsulosin 0.4 mg capsule    Automatic

## 2024-02-20 NOTE — ANESTHESIA PRE PROCEDURE
Department of Anesthesiology  Preprocedure Note       Name:  Reid Nelson   Age:  73 y.o.  :  1950                                          MRN:  [id number]         Date:  2024      Surgeon: Surgeon(s):  Kobi Edwards MD    Procedure: Procedure(s):  EGD ESOPHAGOGASTRODUODENOSCOPY    Medications prior to admission:   Prior to Admission medications    Medication Sig Start Date End Date Taking? Authorizing Provider   allopurinol (ZYLOPRIM) 300 MG tablet ceived the following from [facility]: Outside name: allopurinoL (ZYLOPRIM) 300 mg tablet 21   Automatic Reconciliation, Ar   cephALEXin (KEFLEX) 500 MG capsule Take 1 capsule by mouth 2 times daily  Patient not taking: Reported on 2023   Automatic Reconciliation, Ar   colchicine (COLCRYS) 0.6 MG tablet ceived the following from [facility]: Outside name: colchicine 0.6 mg tablet 21   Automatic Reconciliation, Ar   doxycycline hyclate (VIBRAMYCIN) 100 MG capsule ceived the following from [facility]: Outside name: doxycycline (VIBRAMYCIN) 100 mg capsule  Patient not taking: Reported on 2023   Automatic Reconciliation, Ar   omeprazole (PRILOSEC) 40 MG delayed release capsule Take 1 capsule by mouth daily    Automatic Reconciliation, Ar   sucralfate (CARAFATE) 1 GM tablet Take 1 tablet by mouth 4 times daily    Automatic Reconciliation, Ar   tamsulosin (FLOMAX) 0.4 MG capsule tamsulosin 0.4 mg capsule    Automatic Reconciliation, Ar   triamterene-hydroCHLOROthiazide (MAXZIDE-25) 37.5-25 MG per tablet Take by mouth daily    Automatic Reconciliation, Ar       Current medications:    Current Facility-Administered Medications   Medication Dose Route Frequency Provider Last Rate Last Admin    sodium chloride 0.9 % bolus 25 mL  25 mL IntraVENous PRN Kobi Edwards MD        lactated ringers IV soln infusion   IntraVENous PRN Kobi Edwards MD 50 mL/hr at 24 1122 New Bag at  \"HGB\", \"HCT\", \"MCV\", \"RDW\", \"PLT\"    CMP: No results found for: \"NA\", \"K\", \"CL\", \"CO2\", \"BUN\", \"CREATININE\", \"GFRAA\", \"AGRATIO\", \"LABGLOM\", \"GLUCOSE\", \"GLU\", \"PROT\", \"CALCIUM\", \"BILITOT\", \"ALKPHOS\", \"AST\", \"ALT\"    POC Tests: No results for input(s): \"POCGLU\", \"POCNA\", \"POCK\", \"POCCL\", \"POCBUN\", \"POCHEMO\", \"POCHCT\" in the last 72 hours.    Coags: No results found for: \"PROTIME\", \"INR\", \"APTT\"    HCG (If Applicable): No results found for: \"PREGTESTUR\", \"PREGSERUM\", \"HCG\", \"HCGQUANT\"     ABGs: No results found for: \"PHART\", \"PO2ART\", \"YKE7QKV\", \"ARR7HLF\", \"BEART\", \"I8PKTEAD\"     Type & Screen (If Applicable):  No results found for: \"LABABO\", \"LABRH\"    Drug/Infectious Status (If Applicable):  No results found for: \"HIV\", \"HEPCAB\"    COVID-19 Screening (If Applicable): No results found for: \"COVID19\"        Anesthesia Evaluation  Patient summary reviewed and Nursing notes reviewed   no history of anesthetic complications:   Airway: Mallampati: II  TM distance: >3 FB   Neck ROM: full  Mouth opening: > = 3 FB   Dental: normal exam         Pulmonary:Negative Pulmonary ROS and normal exam  breath sounds clear to auscultation                             Cardiovascular:    (+) hypertension:        Rhythm: regular  Rate: normal                    Neuro/Psych:   Negative Neuro/Psych ROS              GI/Hepatic/Renal:   (+) GERD:          Endo/Other:    (+) : arthritis:..                  ROS comment: Gout Abdominal:              PE comment: Deferred.   Vascular: negative vascular ROS.         Other Findings:             Anesthesia Plan      MAC and TIVA     ASA 2     (Standard ASA monitors: continuous EKG, BP, HR, pulse oximeter, and capnography.)  Induction: intravenous.  continuous noninvasive hemodynamic monitor  MIPS: Prophylactic antiemetics administered.  Anesthetic plan and risks discussed with patient (and family, if present.).      Plan discussed with CRNA.    Attending anesthesiologist reviewed and agrees with

## 2024-02-20 NOTE — ASSESSMENT & PLAN NOTE
4:1  Pt is asymptomatic and hemodynamically stable  Target HR <110 to avoid tachycardia-mediated cardiomyopathy  30 mg diltiazem PO once  Check 2D echocardiogram in AM  Check TSH, FT4, Mg, electrolytes  Cycle cardiac enzymes  [id number] score of 2, possible EGD in am. Hold off on antiplatelet or AC for tonight  Cardiology consultation

## 2024-02-20 NOTE — ED NOTES
ED TO INPATIENT SBAR HANDOFF    Patient Name: Reid Nelson   Preferred Name: Reid  : 1950  73 y.o.   Family/Caregiver Present: no   Code Status Order: No Order  PO Status: no orders in at this time  Telemetry Order: Yes  C-SSRS: Risk of Suicide: No Risk  Sitter no   Restraints:     Sepsis Risk Score Sepsis Risk Score: 4.44    Situation  Chief Complaint   Patient presents with    Irregular Heart Beat     Brief Description of Patient's Condition: pt here for EGD,noted to go into aflutter, brought to ER for further workup  Mental Status: oriented and alert  Arrived from:Home  Imaging:   No orders to display     Abnormal labs:   Abnormal Labs Reviewed   CBC WITH AUTO DIFFERENTIAL - Abnormal; Notable for the following components:       Result Value    WBC 14.4 (*)     Neutrophils % 85 (*)     Lymphocytes % 7 (*)     Neutrophils Absolute 12.1 (*)     Monocytes Absolute 1.2 (*)     Absolute Immature Granulocyte 0.1 (*)     All other components within normal limits       Background  Allergies: No Known Allergies  History:   Past Medical History:   Diagnosis Date    Chronic pharyngitis 2020    Dysphagia 2020    Hypertension     Impacted cerumen 2020    Laryngopharyngeal reflux 2020    Sinus problem     Smoker 2020       Assessment  Vitals: MEWS Score: 1  Level of Consciousness: Alert (0)   Vitals:    24 1345 24 1354 24 1400 24 1445   BP: (!) 140/105 (!) 140/105 (!) 140/103 (!) 152/108   Pulse: 83 87 99 94   Resp: 15 22 21 16   Temp:    97.8 °F (36.6 °C)   TempSrc:    Oral   SpO2: 98% 98% 98% 99%     Deterioration Index (DI): Deterioration Index: 29  Deterioration Index (DI) Interventions Performed:    O2 Flow Rate:    O2 Device: O2 Device: None (Room air)  Cardiac Rhythm:    Critical Lab Results: [unfilled]  Cultures: Cultures:  NIH Score: NIH     Active LDA's:   Peripheral IV 24 Right Antecubital (Active)     Active Central Lines:                          Active

## 2024-02-21 ENCOUNTER — ANESTHESIA EVENT (OUTPATIENT)
Facility: HOSPITAL | Age: 74
DRG: 310 | End: 2024-02-21
Payer: MEDICARE

## 2024-02-21 ENCOUNTER — ANESTHESIA (OUTPATIENT)
Facility: HOSPITAL | Age: 74
DRG: 310 | End: 2024-02-21
Payer: MEDICARE

## 2024-02-21 ENCOUNTER — APPOINTMENT (OUTPATIENT)
Facility: HOSPITAL | Age: 74
DRG: 310 | End: 2024-02-21
Attending: STUDENT IN AN ORGANIZED HEALTH CARE EDUCATION/TRAINING PROGRAM
Payer: MEDICARE

## 2024-02-21 LAB
ANION GAP SERPL CALC-SCNC: 3 MMOL/L (ref 5–15)
BUN SERPL-MCNC: 22 MG/DL (ref 6–20)
BUN/CREAT SERPL: 16 (ref 12–20)
CA-I BLD-MCNC: 9.5 MG/DL (ref 8.5–10.1)
CHLORIDE SERPL-SCNC: 103 MMOL/L (ref 97–108)
CO2 SERPL-SCNC: 30 MMOL/L (ref 21–32)
CREAT SERPL-MCNC: 1.41 MG/DL (ref 0.7–1.3)
ECHO AO ASC DIAM: 3.2 CM
ECHO AO ASCENDING AORTA INDEX: 1.57 CM/M2
ECHO AO ROOT DIAM: 4.5 CM
ECHO AO ROOT INDEX: 2.21 CM/M2
ECHO AV AREA PEAK VELOCITY: 1.8 CM2
ECHO AV AREA VTI: 3.6 CM2
ECHO AV AREA/BSA PEAK VELOCITY: 0.9 CM2/M2
ECHO AV AREA/BSA VTI: 1.8 CM2/M2
ECHO AV MEAN GRADIENT: 1 MMHG
ECHO AV MEAN VELOCITY: 0.6 M/S
ECHO AV PEAK GRADIENT: 3 MMHG
ECHO AV PEAK VELOCITY: 0.9 M/S
ECHO AV VELOCITY RATIO: 0.56
ECHO AV VTI: 9.1 CM
ECHO BSA: 2.07 M2
ECHO LA AREA 4C: 16.3 CM2
ECHO LA DIAMETER INDEX: 1.81 CM/M2
ECHO LA DIAMETER: 3.7 CM
ECHO LA MAJOR AXIS: 4.7 CM
ECHO LA TO AORTIC ROOT RATIO: 0.82
ECHO LA VOL MOD A4C: 45 ML (ref 18–58)
ECHO LA VOLUME INDEX MOD A4C: 22 ML/M2 (ref 16–34)
ECHO LV E' SEPTAL VELOCITY: 5 CM/S
ECHO LV EDV A4C: 72 ML
ECHO LV EDV INDEX A4C: 35 ML/M2
ECHO LV EJECTION FRACTION A4C: 59 %
ECHO LV ESV A4C: 30 ML
ECHO LV ESV INDEX A4C: 15 ML/M2
ECHO LV FRACTIONAL SHORTENING: 37 % (ref 28–44)
ECHO LV INTERNAL DIMENSION DIASTOLE INDEX: 2.01 CM/M2
ECHO LV INTERNAL DIMENSION DIASTOLIC: 4.1 CM (ref 4.2–5.9)
ECHO LV INTERNAL DIMENSION SYSTOLIC INDEX: 1.27 CM/M2
ECHO LV INTERNAL DIMENSION SYSTOLIC: 2.6 CM
ECHO LV IVSD: 1.3 CM (ref 0.6–1)
ECHO LV MASS 2D: 193.5 G (ref 88–224)
ECHO LV MASS INDEX 2D: 94.8 G/M2 (ref 49–115)
ECHO LV POSTERIOR WALL DIASTOLIC: 1.3 CM (ref 0.6–1)
ECHO LV RELATIVE WALL THICKNESS RATIO: 0.63
ECHO LVOT AREA: 3.1 CM2
ECHO LVOT AV VTI INDEX: 1.15
ECHO LVOT DIAM: 2 CM
ECHO LVOT MEAN GRADIENT: 1 MMHG
ECHO LVOT PEAK GRADIENT: 1 MMHG
ECHO LVOT PEAK VELOCITY: 0.5 M/S
ECHO LVOT STROKE VOLUME INDEX: 16.2 ML/M2
ECHO LVOT SV: 33 ML
ECHO LVOT VTI: 10.5 CM
ECHO MV A VELOCITY: 0.36 M/S
ECHO MV E DECELERATION TIME (DT): 110 MS
ECHO MV E VELOCITY: 0.78 M/S
ECHO MV E/A RATIO: 2.17
ECHO MV REGURGITANT PEAK GRADIENT: 27 MMHG
ECHO MV REGURGITANT PEAK VELOCITY: 2.6 M/S
ECHO PV MAX VELOCITY: 0.6 M/S
ECHO PV PEAK GRADIENT: 1 MMHG
ECHO RA AREA 4C: 11 CM2
ECHO RA END SYSTOLIC VOLUME APICAL 4 CHAMBER INDEX BSA: 10 ML/M2
ECHO RA VOLUME: 21 ML
ECHO RV FREE WALL PEAK S': 12 CM/S
ECHO RV TAPSE: 1.8 CM (ref 1.7–?)
ECHO TV REGURGITANT MAX VELOCITY: 1.51 M/S
ECHO TV REGURGITANT PEAK GRADIENT: 9 MMHG
GLUCOSE SERPL-MCNC: 112 MG/DL (ref 65–100)
INR PPP: 1.1 (ref 0.9–1.1)
POTASSIUM SERPL-SCNC: 3.2 MMOL/L (ref 3.5–5.1)
PROTHROMBIN TIME: 14.4 SEC (ref 11.9–14.6)
SODIUM SERPL-SCNC: 136 MMOL/L (ref 136–145)
T4 FREE SERPL-MCNC: 1.1 NG/DL (ref 0.8–1.5)

## 2024-02-21 PROCEDURE — 0DB48ZX EXCISION OF ESOPHAGOGASTRIC JUNCTION, VIA NATURAL OR ARTIFICIAL OPENING ENDOSCOPIC, DIAGNOSTIC: ICD-10-PCS | Performed by: INTERNAL MEDICINE

## 2024-02-21 PROCEDURE — 3700000001 HC ADD 15 MINUTES (ANESTHESIA): Performed by: INTERNAL MEDICINE

## 2024-02-21 PROCEDURE — 7100000010 HC PHASE II RECOVERY - FIRST 15 MIN: Performed by: INTERNAL MEDICINE

## 2024-02-21 PROCEDURE — 7100000011 HC PHASE II RECOVERY - ADDTL 15 MIN: Performed by: INTERNAL MEDICINE

## 2024-02-21 PROCEDURE — 80048 BASIC METABOLIC PNL TOTAL CA: CPT

## 2024-02-21 PROCEDURE — 2580000003 HC RX 258: Performed by: STUDENT IN AN ORGANIZED HEALTH CARE EDUCATION/TRAINING PROGRAM

## 2024-02-21 PROCEDURE — G0378 HOSPITAL OBSERVATION PER HR: HCPCS

## 2024-02-21 PROCEDURE — 93306 TTE W/DOPPLER COMPLETE: CPT

## 2024-02-21 PROCEDURE — 6370000000 HC RX 637 (ALT 250 FOR IP): Performed by: INTERNAL MEDICINE

## 2024-02-21 PROCEDURE — 94761 N-INVAS EAR/PLS OXIMETRY MLT: CPT

## 2024-02-21 PROCEDURE — 2709999900 HC NON-CHARGEABLE SUPPLY: Performed by: INTERNAL MEDICINE

## 2024-02-21 PROCEDURE — 6360000002 HC RX W HCPCS: Performed by: HOSPITALIST

## 2024-02-21 PROCEDURE — 3600007502: Performed by: INTERNAL MEDICINE

## 2024-02-21 PROCEDURE — 85610 PROTHROMBIN TIME: CPT

## 2024-02-21 PROCEDURE — 2580000003 HC RX 258: Performed by: NURSE PRACTITIONER

## 2024-02-21 PROCEDURE — 2500000003 HC RX 250 WO HCPCS: Performed by: NURSE PRACTITIONER

## 2024-02-21 PROCEDURE — 6360000002 HC RX W HCPCS: Performed by: NURSE PRACTITIONER

## 2024-02-21 PROCEDURE — 6370000000 HC RX 637 (ALT 250 FOR IP): Performed by: STUDENT IN AN ORGANIZED HEALTH CARE EDUCATION/TRAINING PROGRAM

## 2024-02-21 PROCEDURE — 3600007512: Performed by: INTERNAL MEDICINE

## 2024-02-21 PROCEDURE — 3700000000 HC ANESTHESIA ATTENDED CARE: Performed by: INTERNAL MEDICINE

## 2024-02-21 PROCEDURE — 36415 COLL VENOUS BLD VENIPUNCTURE: CPT

## 2024-02-21 PROCEDURE — 88305 TISSUE EXAM BY PATHOLOGIST: CPT

## 2024-02-21 RX ORDER — PHENYLEPHRINE HCL IN 0.9% NACL 1 MG/10 ML
SYRINGE (ML) INTRAVENOUS PRN
Status: DISCONTINUED | OUTPATIENT
Start: 2024-02-21 | End: 2024-02-21 | Stop reason: SDUPTHER

## 2024-02-21 RX ORDER — SODIUM CHLORIDE, SODIUM LACTATE, POTASSIUM CHLORIDE, CALCIUM CHLORIDE 600; 310; 30; 20 MG/100ML; MG/100ML; MG/100ML; MG/100ML
INJECTION, SOLUTION INTRAVENOUS CONTINUOUS PRN
Status: DISCONTINUED | OUTPATIENT
Start: 2024-02-21 | End: 2024-02-21 | Stop reason: SDUPTHER

## 2024-02-21 RX ORDER — ONDANSETRON 2 MG/ML
4 INJECTION INTRAMUSCULAR; INTRAVENOUS EVERY 6 HOURS PRN
Status: DISCONTINUED | OUTPATIENT
Start: 2024-02-21 | End: 2024-02-22 | Stop reason: HOSPADM

## 2024-02-21 RX ORDER — LIDOCAINE HYDROCHLORIDE 20 MG/ML
INJECTION, SOLUTION EPIDURAL; INFILTRATION; INTRACAUDAL; PERINEURAL PRN
Status: DISCONTINUED | OUTPATIENT
Start: 2024-02-21 | End: 2024-02-21 | Stop reason: SDUPTHER

## 2024-02-21 RX ORDER — PROPOFOL 10 MG/ML
INJECTION, EMULSION INTRAVENOUS PRN
Status: DISCONTINUED | OUTPATIENT
Start: 2024-02-21 | End: 2024-02-21 | Stop reason: SDUPTHER

## 2024-02-21 RX ORDER — ESMOLOL HYDROCHLORIDE 10 MG/ML
INJECTION INTRAVENOUS PRN
Status: DISCONTINUED | OUTPATIENT
Start: 2024-02-21 | End: 2024-02-21 | Stop reason: SDUPTHER

## 2024-02-21 RX ADMIN — PROPOFOL 50 MG: 10 INJECTION, EMULSION INTRAVENOUS at 13:55

## 2024-02-21 RX ADMIN — ALLOPURINOL 300 MG: 300 TABLET ORAL at 08:27

## 2024-02-21 RX ADMIN — Medication 200 MCG: at 13:56

## 2024-02-21 RX ADMIN — PROPOFOL 150 MG: 10 INJECTION, EMULSION INTRAVENOUS at 13:52

## 2024-02-21 RX ADMIN — DILTIAZEM HYDROCHLORIDE 30 MG: 30 TABLET, FILM COATED ORAL at 11:44

## 2024-02-21 RX ADMIN — ESMOLOL HYDROCHLORIDE 10 MG: 10 INJECTION, SOLUTION INTRAVENOUS at 13:59

## 2024-02-21 RX ADMIN — PANTOPRAZOLE SODIUM 40 MG: 40 TABLET, DELAYED RELEASE ORAL at 05:40

## 2024-02-21 RX ADMIN — LIDOCAINE HYDROCHLORIDE 100 MG: 20 INJECTION, SOLUTION EPIDURAL; INFILTRATION; INTRACAUDAL; PERINEURAL at 13:44

## 2024-02-21 RX ADMIN — TAMSULOSIN HYDROCHLORIDE 0.4 MG: 0.4 CAPSULE ORAL at 08:27

## 2024-02-21 RX ADMIN — Medication 200 MCG: at 14:02

## 2024-02-21 RX ADMIN — TRIAMTERENE AND HYDROCHLOROTHIAZIDE 1 TABLET: 37.5; 25 TABLET ORAL at 08:36

## 2024-02-21 RX ADMIN — DILTIAZEM HYDROCHLORIDE 30 MG: 30 TABLET, FILM COATED ORAL at 05:40

## 2024-02-21 RX ADMIN — SODIUM CHLORIDE, PRESERVATIVE FREE 10 ML: 5 INJECTION INTRAVENOUS at 08:27

## 2024-02-21 RX ADMIN — ONDANSETRON 4 MG: 2 INJECTION INTRAMUSCULAR; INTRAVENOUS at 17:31

## 2024-02-21 RX ADMIN — SODIUM CHLORIDE, PRESERVATIVE FREE 10 ML: 5 INJECTION INTRAVENOUS at 20:19

## 2024-02-21 RX ADMIN — SUCRALFATE 1 G: 1 TABLET ORAL at 17:22

## 2024-02-21 RX ADMIN — SODIUM CHLORIDE, POTASSIUM CHLORIDE, SODIUM LACTATE AND CALCIUM CHLORIDE: 600; 310; 30; 20 INJECTION, SOLUTION INTRAVENOUS at 13:38

## 2024-02-21 RX ADMIN — SUCRALFATE 1 G: 1 TABLET ORAL at 20:18

## 2024-02-21 RX ADMIN — COLCHICINE 0.6 MG: 0.6 TABLET, FILM COATED ORAL at 08:27

## 2024-02-21 RX ADMIN — DILTIAZEM HYDROCHLORIDE 30 MG: 30 TABLET, FILM COATED ORAL at 17:28

## 2024-02-21 RX ADMIN — ACETAMINOPHEN 650 MG: 325 TABLET ORAL at 08:27

## 2024-02-21 ASSESSMENT — PAIN SCALES - GENERAL
PAINLEVEL_OUTOF10: 0
PAINLEVEL_OUTOF10: 6

## 2024-02-21 ASSESSMENT — PAIN DESCRIPTION - LOCATION: LOCATION: HEAD

## 2024-02-21 ASSESSMENT — LIFESTYLE VARIABLES: SMOKING_STATUS: 1

## 2024-02-21 NOTE — PROGRESS NOTES
Dual skin assessment completed with Virgilio Diaz revealed the skin to be clean dry and intact. There were no pressure injuries seen.

## 2024-02-21 NOTE — PROGRESS NOTES
Progress Note      2/21/2024 3:38 PM  NAME: Reid Nelson   MRN:  [id number]   Admit Diagnosis: Atrial flutter with controlled response (HCC) [I48.92]  Abdominal pain, unspecified abdominal location [R10.9]  Atrial flutter, unspecified type (HCC) [I48.92]  Dysphagia, unspecified type [R13.10]  Gastroesophageal reflux disease, unspecified whether esophagitis present [K21.9]      Problem List:   Patient is a 73-year-old -American male with:  1.  Atrial fibrillation/flutter, new diagnosis  2.  Hypertension  3.  Obstructive sleep apnea   4.  Dysphagia  5.  Nicotine dependence            Assessment/Plan:     Patient underwent upper endoscopy that showed small hiatal hernia, scar at the gastroesophageal junction that was biopsied, gastritis.    Heart rate is well-controlled.  Blood pressure is running on the lower side.    Stop triamterene/HCTZ due to hypotension.  Would probably continue with only Cardizem and adjust medications as tolerated  Once okay with GI then consider anticoagulation.  Will plan for outpatient AJIT and possible cardioversion         []       High complexity decision making was performed in this patient at high risk for decompensation with multiple organ involvement.    Subjective:     Reid Nelson denies chest pain, dyspnea.  Discussed with RN events overnight.     Review of Systems:   Negative except for as noted above.    Objective:      Physical Exam:    Last 24hrs VS reviewed since prior progress note. Most recent are:    BP 92/69   Pulse 78   Temp 97.3 °F (36.3 °C) (Oral)   Resp 19   Ht 1.75 m (5' 8.9\")   Wt 87.9 kg (193 lb 12.6 oz)   SpO2 98%   BMI 28.70 kg/m²     Intake/Output Summary (Last 24 hours) at 2/21/2024 1538  Last data filed at 2/21/2024 1403  Gross per 24 hour   Intake 100 ml   Output --   Net 100 ml        General Appearance: Alert; no acute distress.  Ears/Nose/Mouth/Throat: moist mucous membranes  Neck: Supple.  Chest: Lungs clear to auscultation  bilaterally.  Cardiovascular: Regular rate and rhythm, S1S2 normal  Abdomen: Soft, non-tender, bowel sounds are active.  Extremities: No edema bilaterally.  Skin: Warm and dry.      PMH/SH reviewed - no change compared to H&P    Telemetry: Atrial fibrillation    EKG:     No valid procedures specified.    []  No new EKG for review    Lab Data Personally Reviewed:    Recent Labs     02/20/24  1237 02/20/24  1355   WBC 4.6 14.4*   HGB 14.4 13.7   HCT 44.5 41.0    239     Recent Labs     02/21/24  0453   INR 1.1      Recent Labs     02/20/24  1237 02/20/24  1355 02/21/24  0453    132* 136   K 4.3 Hemolyzed, Recollection Recommended 3.2*    104 103   CO2 31 26 30   BUN 21* 17 22*     No results for input(s): \"CPK\" in the last 72 hours.    Invalid input(s): \"CPKMB\", \"CKNDX\", \"TROIQ\"  No results found for: \"CHOL\", \"CHOLX\", \"CHLST\", \"CHOLV\", \"HDL\", \"HDLC\", \"LDL\", \"LDLC\", \"TGLX\", \"TRIGL\"    Recent Labs     02/20/24  1237 02/20/24  1355   GLOB 4.1* 3.4     No results for input(s): \"PH\", \"PCO2\", \"PO2\" in the last 72 hours.    Medications Personally Reviewed:    Current Facility-Administered Medications   Medication Dose Route Frequency    ondansetron (ZOFRAN) injection 4 mg  4 mg IntraVENous Q6H PRN    allopurinol (ZYLOPRIM) tablet 300 mg  300 mg Oral Daily    colchicine (COLCRYS) tablet 0.6 mg  0.6 mg Oral Daily    pantoprazole (PROTONIX) tablet 40 mg  40 mg Oral QAM AC    sucralfate (CARAFATE) tablet 1 g  1 g Oral 4x Daily AC & HS    tamsulosin (FLOMAX) capsule 0.4 mg  0.4 mg Oral Daily    triamterene-hydroCHLOROthiazide (MAXZIDE-25) 37.5-25 MG per tablet 1 tablet  1 tablet Oral Daily    sodium chloride flush 0.9 % injection 5-40 mL  5-40 mL IntraVENous 2 times per day    sodium chloride flush 0.9 % injection 5-40 mL  5-40 mL IntraVENous PRN    0.9 % sodium chloride infusion   IntraVENous PRN    polyethylene glycol (GLYCOLAX) packet 17 g  17 g Oral Daily PRN    acetaminophen (TYLENOL) tablet 650 mg  650 mg

## 2024-02-21 NOTE — PROGRESS NOTES
Patient NPO for EGD. Patient initially scheduled for EGD as OP on 2/20/24 was found in a-flutter/afib EGD was canceled, admitted and rescheduled for today. Patient w/ esophageal related dysphagia w/ globus sensation and no reports of oropharyngeal dysphagia.  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 ST services are not warranted. Will d/c ST consult defer to GI for diet.

## 2024-02-21 NOTE — PROGRESS NOTES
.Progress Note (Hospitalist, Internal Medicine)  IDENTIFYING INFORMATION   PATIENT:  Reid Nelson  MRN:  [id number]  ADMIT DATE: 2/20/2024  TIME OF EVALUATION: 2/21/2024 3:58 PM      HISTORY OF PRESENT ILLNESS   Reid Nelson is a 73 y.o. male who presents with atrial flutter which sent him to the ED.  He was scheduled to get an EGD during which she was found to be in atrial flutter.      SUBJECTIVE     He is currently rate controlled.  Follow-up on EGD and echo.  Has no new complaints.    MEDICATIONS   Medications Prior to Admission  Medications Prior to Admission: allopurinol (ZYLOPRIM) 300 MG tablet, ceived the following from [facility]: Outside name: allopurinoL (ZYLOPRIM) 300 mg tablet  cephALEXin (KEFLEX) 500 MG capsule, Take 1 capsule by mouth 2 times daily (Patient not taking: Reported on 6/26/2023)  colchicine (COLCRYS) 0.6 MG tablet, ceived the following from [facility]: Outside name: colchicine 0.6 mg tablet  doxycycline hyclate (VIBRAMYCIN) 100 MG capsule, ceived the following from [facility]: Outside name: doxycycline (VIBRAMYCIN) 100 mg capsule (Patient not taking: Reported on 6/26/2023)  omeprazole (PRILOSEC) 40 MG delayed release capsule, Take 1 capsule by mouth daily  sucralfate (CARAFATE) 1 GM tablet, Take 1 tablet by mouth 4 times daily  tamsulosin (FLOMAX) 0.4 MG capsule, tamsulosin 0.4 mg capsule  triamterene-hydroCHLOROthiazide (MAXZIDE-25) 37.5-25 MG per tablet, Take by mouth daily    Current Medications  Current Facility-Administered Medications   Medication Dose Route Frequency Provider Last Rate Last Admin    ondansetron (ZOFRAN) injection 4 mg  4 mg IntraVENous Q6H PRN Luisito Gabriel MD        allopurinol (ZYLOPRIM) tablet 300 mg  300 mg Oral Daily Mati Amato, DO   300 mg at 02/21/24 0827    colchicine (COLCRYS) tablet 0.6 mg  0.6 mg Oral Daily Mati Amato, DO   0.6 mg at 02/21/24 0827    pantoprazole (PROTONIX) tablet 40 mg   40 mg Oral QAM AC Mati Amato, DO   40 mg at 02/21/24 0540    sucralfate (CARAFATE) tablet 1 g  1 g Oral 4x Daily AC & HS Olivier Amatoo, DO   1 g at 02/20/24 2058    tamsulosin (FLOMAX) capsule 0.4 mg  0.4 mg Oral Daily Olivier Amatoo, DO   0.4 mg at 02/21/24 0827    sodium chloride flush 0.9 % injection 5-40 mL  5-40 mL IntraVENous 2 times per day Olivier Amatoo, DO   10 mL at 02/21/24 0827    sodium chloride flush 0.9 % injection 5-40 mL  5-40 mL IntraVENous PRN Mati Amato, DO        0.9 % sodium chloride infusion   IntraVENous PRN Mati Amato, DO        polyethylene glycol (GLYCOLAX) packet 17 g  17 g Oral Daily PRN Olivier Amatoo, DO        acetaminophen (TYLENOL) tablet 650 mg  650 mg Oral Q6H PRN Mati Amato, DO   650 mg at 02/21/24 0827    Or    acetaminophen (TYLENOL) suppository 650 mg  650 mg Rectal Q6H PRN Mati Amato, DO        prochlorperazine (COMPAZINE) injection 10 mg  10 mg IntraVENous Q6H PRN Mati Amato, DO        dilTIAZem (CARDIZEM) tablet 30 mg  30 mg Oral 4 times per day Sylvie Burnett MD   30 mg at 02/21/24 1144         Allergies  No Known Allergies    REVIEW OF SYSTEMS     Within above limitations. 14 point review of systems reviewed. Pertinent positive or negative as per HPI or otherwise negative per 14 point systems review.     Reviewed 2/21/2024 at 3:58 PM    PHYSICAL EXAM       Blood pressure 92/69, pulse 78, temperature 97.3 °F (36.3 °C), temperature source Oral, resp. rate 19, height 1.75 m (5' 8.9\"), weight 87.9 kg (193 lb 12.6 oz), SpO2 98 %.    General: Patient is awake, alert, oriented with appropriate mood and affect  HEENT: Atraumatic, normocephalic, pupils equal equally reactive to light.  Mucous membranes moist  Heart: First and second heart sounds present  Lungs: Air entry adequate  Abdomen: Soft, nontender, nondistended.  Bowel sounds present with regular frequency  Genitals: Deferred  Skin:  Extremities:  pedal pulses palpable with good

## 2024-02-21 NOTE — ANESTHESIA PRE PROCEDURE
consistent with concentric hypertrophy. Normal wall motion.  ·  Mitral Valve: Mild regurgitation with multiple jets.  ·  Left Atrium: Left atrium is mildly dilated.  ·  Aorta: Normal sized ascending aorta. Dilated aortic root. Ao root diameter is 4.5 cm.  ·  Image quality is adequate. Technically difficult study due to patient's heart rhythm.       Neuro/Psych:   Negative Neuro/Psych ROS              GI/Hepatic/Renal:   (+) GERD:          Endo/Other: Negative Endo/Other ROS                    Abdominal:             Vascular: negative vascular ROS.         Other Findings:         Anesthesia Plan      MAC and TIVA     ASA 3       Induction: intravenous.  continuous noninvasive hemodynamic monitor    Anesthetic plan and risks discussed with patient.      Plan discussed with CRNA.    Attending anesthesiologist reviewed and agrees with Preprocedure content              Ketan Salazar MD   2/21/2024

## 2024-02-21 NOTE — ANESTHESIA POSTPROCEDURE EVALUATION
Department of Anesthesiology  Postprocedure Note    Patient: Reid Nelson  MRN: [id number]  YOB: 1950  Date of evaluation: 2/21/2024    Procedure Summary       Date: 02/21/24 Room / Location: [facility] 02 / [facility] ENDOSCOPY    Anesthesia Start: 1338 Anesthesia Stop: 1404    Procedure: EGD ESOPHAGOGASTRODUODENOSCOPY (Upper GI Region) Diagnosis:       Dysphagia, unspecified type      (Dysphagia, unspecified type [R13.10])    Surgeons: Kobi Edwards MD Responsible Provider: Ketan Salazar MD    Anesthesia Type: MAC, TIVA ASA Status: 3            Anesthesia Type: No value filed.    Colleen Phase I: Colleen Score: 10    Colleen Phase II:      Anesthesia Post Evaluation    Patient location during evaluation: bedside (Endoscopy Unit)  Patient participation: complete - patient participated  Level of consciousness: sleepy but conscious  Pain score: 0  Airway patency: patent  Nausea & Vomiting: no nausea and no vomiting  Cardiovascular status: hemodynamically stable  Respiratory status: acceptable  Hydration status: stable  Comments: This patient remained on the stretcher.  The patient was handed off to the endoscopy nursing team.  All questions regarding pre-, intra-, and postoperative care were answered.  Multimodal analgesia pain management approach    No notable events documented.

## 2024-02-21 NOTE — CARE COORDINATION
02/21/24 1547   Service Assessment   Patient Orientation Alert and Oriented   Cognition Alert   History Provided By Patient   Primary Caregiver Self   Support Systems Spouse/Significant Other   Patient's Healthcare Decision Maker is: Legal Next of Kin   PCP Verified by CM Yes   Last Visit to PCP Within last 3 months   Prior Functional Level Independent in ADLs/IADLs   Current Functional Level Independent in ADLs/IADLs   Can patient return to prior living arrangement Yes   Ability to make needs known: Good   Family able to assist with home care needs: Yes   Would you like for me to discuss the discharge plan with any other family members/significant others, and if so, who? No   Financial Resources Medicare   Community Resources None   Social/Functional History   Lives With Spouse   Type of Home House     Patient lives at home with wife. No DME. Pharmacy [facility], agreeable to meds to beds. Current disp: Home with wife    Advance Care Planning     General Advance Care Planning (ACP) Conversation    Date of Conversation: 2/21/2024  Conducted with: Patient with Decision Making Capacity    Healthcare Decision Maker:  No healthcare decision makers have been documented.  Click here to complete HealthCare Decision Makers including selection of the Healthcare Decision Maker Relationship (ie \"Primary\")   Today we documented Decision Maker(s) consistent with Legal Next of Kin hierarchy.    Content/Action Overview:  Has ACP document(s) on file - reflects the patient's care preferences  Reviewed DNR/DNI and patient elects Full Code (Attempt Resuscitation)        Length of Voluntary ACP Conversation in minutes:  <16 minutes (Non-Billable)    Denisse Stephen RN

## 2024-02-22 VITALS
HEIGHT: 69 IN | BODY MASS INDEX: 27.04 KG/M2 | RESPIRATION RATE: 18 BRPM | SYSTOLIC BLOOD PRESSURE: 121 MMHG | WEIGHT: 182.54 LBS | HEART RATE: 72 BPM | TEMPERATURE: 97.3 F | DIASTOLIC BLOOD PRESSURE: 94 MMHG | OXYGEN SATURATION: 100 %

## 2024-02-22 PROBLEM — I48.91 ATRIAL FIBRILLATION (HCC): Status: ACTIVE | Noted: 2024-02-22

## 2024-02-22 LAB
ANION GAP SERPL CALC-SCNC: 4 MMOL/L (ref 5–15)
BUN SERPL-MCNC: 22 MG/DL (ref 6–20)
BUN/CREAT SERPL: 12 (ref 12–20)
CA-I BLD-MCNC: 9.4 MG/DL (ref 8.5–10.1)
CHLORIDE SERPL-SCNC: 103 MMOL/L (ref 97–108)
CO2 SERPL-SCNC: 27 MMOL/L (ref 21–32)
CREAT SERPL-MCNC: 1.82 MG/DL (ref 0.7–1.3)
ERYTHROCYTE [DISTWIDTH] IN BLOOD BY AUTOMATED COUNT: 14.3 % (ref 11.5–14.5)
GLUCOSE SERPL-MCNC: 174 MG/DL (ref 65–100)
HCT VFR BLD AUTO: 45.9 % (ref 36.6–50.3)
HGB BLD-MCNC: 15.2 G/DL (ref 12.1–17)
MCH RBC QN AUTO: 28.7 PG (ref 26–34)
MCHC RBC AUTO-ENTMCNC: 33.1 G/DL (ref 30–36.5)
MCV RBC AUTO: 86.8 FL (ref 80–99)
NRBC # BLD: 0 K/UL (ref 0–0.01)
NRBC BLD-RTO: 0 PER 100 WBC
PLATELET # BLD AUTO: 235 K/UL (ref 150–400)
PMV BLD AUTO: 10.9 FL (ref 8.9–12.9)
POTASSIUM SERPL-SCNC: 3.3 MMOL/L (ref 3.5–5.1)
RBC # BLD AUTO: 5.29 M/UL (ref 4.1–5.7)
SODIUM SERPL-SCNC: 134 MMOL/L (ref 136–145)
WBC # BLD AUTO: 4.9 K/UL (ref 4.1–11.1)

## 2024-02-22 PROCEDURE — 80048 BASIC METABOLIC PNL TOTAL CA: CPT

## 2024-02-22 PROCEDURE — 1100000000 HC RM PRIVATE

## 2024-02-22 PROCEDURE — 36415 COLL VENOUS BLD VENIPUNCTURE: CPT

## 2024-02-22 PROCEDURE — G0378 HOSPITAL OBSERVATION PER HR: HCPCS

## 2024-02-22 PROCEDURE — 6370000000 HC RX 637 (ALT 250 FOR IP): Performed by: INTERNAL MEDICINE

## 2024-02-22 PROCEDURE — 6370000000 HC RX 637 (ALT 250 FOR IP): Performed by: STUDENT IN AN ORGANIZED HEALTH CARE EDUCATION/TRAINING PROGRAM

## 2024-02-22 PROCEDURE — 85027 COMPLETE CBC AUTOMATED: CPT

## 2024-02-22 PROCEDURE — 2580000003 HC RX 258: Performed by: STUDENT IN AN ORGANIZED HEALTH CARE EDUCATION/TRAINING PROGRAM

## 2024-02-22 RX ORDER — DILTIAZEM HYDROCHLORIDE 120 MG/1
120 CAPSULE, COATED, EXTENDED RELEASE ORAL DAILY
Status: DISCONTINUED | OUTPATIENT
Start: 2024-02-22 | End: 2024-02-22 | Stop reason: HOSPADM

## 2024-02-22 RX ORDER — DILTIAZEM HYDROCHLORIDE 120 MG/1
120 CAPSULE, COATED, EXTENDED RELEASE ORAL DAILY
Qty: 30 CAPSULE | Refills: 3 | Status: SHIPPED | OUTPATIENT
Start: 2024-02-23

## 2024-02-22 RX ADMIN — SUCRALFATE 1 G: 1 TABLET ORAL at 05:58

## 2024-02-22 RX ADMIN — PANTOPRAZOLE SODIUM 40 MG: 40 TABLET, DELAYED RELEASE ORAL at 05:58

## 2024-02-22 RX ADMIN — COLCHICINE 0.6 MG: 0.6 TABLET, FILM COATED ORAL at 08:50

## 2024-02-22 RX ADMIN — ALLOPURINOL 300 MG: 300 TABLET ORAL at 08:50

## 2024-02-22 RX ADMIN — SODIUM CHLORIDE, PRESERVATIVE FREE 5 ML: 5 INJECTION INTRAVENOUS at 08:51

## 2024-02-22 RX ADMIN — TAMSULOSIN HYDROCHLORIDE 0.4 MG: 0.4 CAPSULE ORAL at 08:50

## 2024-02-22 RX ADMIN — SUCRALFATE 1 G: 1 TABLET ORAL at 11:39

## 2024-02-22 RX ADMIN — POLYETHYLENE GLYCOL 3350 17 G: 17 POWDER, FOR SOLUTION ORAL at 08:50

## 2024-02-22 RX ADMIN — DILTIAZEM HYDROCHLORIDE 30 MG: 30 TABLET, FILM COATED ORAL at 00:32

## 2024-02-22 RX ADMIN — DILTIAZEM HYDROCHLORIDE 30 MG: 30 TABLET, FILM COATED ORAL at 05:58

## 2024-02-22 RX ADMIN — SUCRALFATE 1 G: 1 TABLET ORAL at 16:52

## 2024-02-22 RX ADMIN — DILTIAZEM HYDROCHLORIDE 120 MG: 120 CAPSULE, COATED, EXTENDED RELEASE ORAL at 11:40

## 2024-02-22 NOTE — DISCHARGE SUMMARY
Reid Nelson 1950 [id number]  PCP:  Brandt Peterson MD    Admit date: 2/20/2024  Admitting Physician: Luisito Gabriel MD    Discharge date: 2/22/2024 Discharge Physician: Luisito Gabriel MD      Reason for admission:   Chief Complaint   Patient presents with    Irregular Heart Beat     Present on Admission:   Atrial flutter with controlled response (HCC)   Laryngopharyngeal reflux   Smoker   Hypertension   Atrial fibrillation (HCC)       Discharge Diagnoses & Hospital Course::     Atrial flutter,  Hypertension,  Smoking    Reid Nelson is a 73 y.o. male who presents with atrial flutter which sent him to the ED.  He was scheduled to get an EGD during which she was found to be in atrial flutter.  He was initiated on oral Cardizem to which he responded.  He was also initiated on Eliquis for stroke prophylaxis.  Patient discharged to follow-up with cardiology in the office and also to consider AJIT for possible cardioversion    Patient was feeling and looking better on the day of discharge.  Patient was discharged in a stable condition with following medications and instructions.        Exam:   Wt Readings from Last 3 Encounters:   02/22/24 82.8 kg (182 lb 8.7 oz)   08/07/23 88.5 kg (195 lb)   07/27/22 72.6 kg (160 lb)       Blood pressure 114/88, pulse 74, temperature 97.7 °F (36.5 °C), temperature source Oral, resp. rate 18, height 1.75 m (5' 8.9\"), weight 82.8 kg (182 lb 8.7 oz), SpO2 99 %.    General: Patient is awake, alert, oriented with appropriate mood and affect  HEENT: Atraumatic, no cephalic, pupils equal equally reactive to light.  Mucous membranes moist  Heart: First and second heart sounds present  Lungs: Air entry adequate  Abdomen: Soft, nontender, nondistended.  Bowel sounds present with regular frequency  Genitals: Deferred  Skin:  Extremities: Bilateral pedal edema, pedal pulses palpable with good volume.  CNS: Cranial nerves II to XII intact with no focal neurological deficit.      Significant

## 2024-02-22 NOTE — PROGRESS NOTES
With discharge orders, per Dr. Oswald  via phone call, Eliquis to be started tomorrow, Patient, fully alert and oriented, who is sitting on bed informed. Wife has been informed via phone call about the updates. IV and telebox removed. Discharge instructions printed and discussed each with the patient including the information about Eliquis.    1710H wheeled patient down with his personal belongings and discharge packet. Patient went home with his wife.

## 2024-02-22 NOTE — PROGRESS NOTES
Gastroenterology Progress Note        Patient: Reid Nelson MRN: [id number]  SSN: [id number]    YOB: 1950  Age: 73 y.o.  Sex: male      Admit Date: 2/20/2024    LOS: 0 days     Subjective:   Mild heartburn, no severe dysphagia  Past Medical History:   Diagnosis Date    Chronic pharyngitis 1/7/2020    Dysphagia 1/7/2020    Hypertension     Impacted cerumen 1/7/2020    Laryngopharyngeal reflux 1/7/2020    Sinus problem     Smoker 1/7/2020        Current Facility-Administered Medications:     dilTIAZem (CARDIZEM CD) extended release capsule 120 mg, 120 mg, Oral, Daily, Sylvie Burnett MD, 120 mg at 02/22/24 1140    ondansetron (ZOFRAN) injection 4 mg, 4 mg, IntraVENous, Q6H PRN, Luisito Gabriel MD, 4 mg at 02/21/24 1731    allopurinol (ZYLOPRIM) tablet 300 mg, 300 mg, Oral, Daily, Lima, Mati, DO, 300 mg at 02/22/24 0850    colchicine (COLCRYS) tablet 0.6 mg, 0.6 mg, Oral, Daily, Manuhong, Mati, DO, 0.6 mg at 02/22/24 0850    pantoprazole (PROTONIX) tablet 40 mg, 40 mg, Oral, QAM AC, Manuele, Mati, DO, 40 mg at 02/22/24 0558    sucralfate (CARAFATE) tablet 1 g, 1 g, Oral, 4x Daily AC & HS, Manuele, Mati, DO, 1 g at 02/22/24 1139    tamsulosin (FLOMAX) capsule 0.4 mg, 0.4 mg, Oral, Daily, Manuele, Mati, DO, 0.4 mg at 02/22/24 0850    sodium chloride flush 0.9 % injection 5-40 mL, 5-40 mL, IntraVENous, 2 times per day, Manuele, Mati, DO, 5 mL at 02/22/24 0851    sodium chloride flush 0.9 % injection 5-40 mL, 5-40 mL, IntraVENous, PRN, Lima, Mati, DO    0.9 % sodium chloride infusion, , IntraVENous, PRN, Mati Amato DO    polyethylene glycol (GLYCOLAX) packet 17 g, 17 g, Oral, Daily PRN, Mati Amato DO, 17 g at 02/22/24 0850    acetaminophen (TYLENOL) tablet 650 mg, 650 mg, Oral, Q6H PRN, 650 mg at 02/21/24 0827 **OR** acetaminophen (TYLENOL) suppository 650 mg, 650 mg, Rectal, Q6H PRN, Mati Amato DO    prochlorperazine (COMPAZINE) injection 10 mg, 10 mg,

## 2024-02-22 NOTE — PROGRESS NOTES
Progress Note      2/22/2024 10:25 AM  NAME: Reid Nelson   MRN:  [id number]   Admit Diagnosis: Atrial flutter with controlled response (HCC) [I48.92]  Abdominal pain, unspecified abdominal location [R10.9]  Atrial flutter, unspecified type (HCC) [I48.92]  Dysphagia, unspecified type [R13.10]  Gastroesophageal reflux disease, unspecified whether esophagitis present [K21.9]  Atrial fibrillation (HCC) [I48.91]      Problem List:   Patient is a 73-year-old -American male with:  1.  Atrial fibrillation/flutter, new diagnosis  2.  Hypertension  3.  Obstructive sleep apnea   4.  Dysphagia  5.  Nicotine dependence            Assessment/Plan:     No acute overnight.  Blood pressure is normal.  Okay to discharge from cardiac standpoint.  Continue oral Cardizem while holding off the hydrochlorothiazide/triamterene.  Once okay with GI then consider starting anticoagulation Eliquis 5 mg twice a day.  I will see him in 2 weeks in the office and plan for AJIT and possible cardioversion.         []       High complexity decision making was performed in this patient at high risk for decompensation with multiple organ involvement.    Subjective:     Reid Nelson denies chest pain, dyspnea.  Discussed with RN events overnight.     Review of Systems:   Negative except for as noted above.    Objective:      Physical Exam:    Last 24hrs VS reviewed since prior progress note. Most recent are:    /74   Pulse 76   Temp 97.7 °F (36.5 °C) (Oral)   Resp 18   Ht 1.75 m (5' 8.9\")   Wt 82.8 kg (182 lb 8.7 oz)   SpO2 99%   BMI 27.04 kg/m²     Intake/Output Summary (Last 24 hours) at 2/22/2024 1025  Last data filed at 2/21/2024 1403  Gross per 24 hour   Intake 100 ml   Output --   Net 100 ml          General Appearance: Alert; no acute distress.  Ears/Nose/Mouth/Throat: moist mucous membranes  Neck: Supple.  Chest: Lungs clear to auscultation bilaterally.  Cardiovascular: Regular rate and rhythm, S1S2  IntraVENous Q6H PRN    dilTIAZem (CARDIZEM) tablet 30 mg  30 mg Oral 4 times per day         Electronically signed by    Sylvie Burnett MD  February 22, 2024   10:25 AM

## 2024-02-22 NOTE — CARE COORDINATION
CM noted discharge order. No CM needs. Nurse aware.     Transition of Care Plan:    RUR: 9%  Prior Level of Functioning: Independent  Disposition: Home with wife  If SNF or IPR: Date FOC offered: n/a  Date FOC received: n/a  Accepting facility: n/a  Date authorization started with reference number: n/a  Date authorization received and expires: n/a  Follow up appointments: Per MD  DME needed: n/a  Transportation at discharge: Wife  IM/IMM Medicare/ letter given: Yes  Is patient a [facility] and connected with [facility]? n/a   If yes, was [facility] transfer form completed and [facility] notified?   Caregiver Contact: n/a  Discharge Caregiver contacted prior to discharge? n/a  Care Conference needed? No  Barriers to discharge: None

## 2024-02-23 LAB
EKG ATRIAL RATE: 326 BPM
EKG DIAGNOSIS: NORMAL
EKG P AXIS: 254 DEGREES
EKG Q-T INTERVAL: 420 MS
EKG QRS DURATION: 104 MS
EKG QTC CALCULATION (BAZETT): 459 MS
EKG R AXIS: 42 DEGREES
EKG T AXIS: 18 DEGREES
EKG VENTRICULAR RATE: 72 BPM

## 2024-03-07 NOTE — PROGRESS NOTES
Physician Progress Note      PATIENT:               THAD MOON  CSN #:                  [id number]  :                       1950  ADMIT DATE:       2024 10:44 AM  DISCH DATE:        2024 5:15 PM  RESPONDING  PROVIDER #:        Luisito Gabriel MD          QUERY TEXT:    Pt admitted with atrial flutter. Pt noted to have elevated creatinine levels.   If possible, please document in the progress notes and discharge summary if   you are evaluating and/or treating any of the following:    The medical record reflects the following:  Risk Factors: 72 yo male with atrial flutter, HTN, smoker  Clinical Indicators: Creatinine levels:   = 1.1   = 1.41   = 1.82  Treatment: IV Normal Saline    Thank you,  Xochitl Ca RN, CCDS    Defined by Kidney Disease Improving Global Outcomes (KDIGO) clinical practice   guideline for acute kidney injury:  -Increase in SCr by greater than or equal to 0.3 mg/dl within 48 hours; or  -Increase or decrease in SCr to greater than or equal to 1.5 times baseline,   which is known or presumed to have occurred within the prior 7 days; or  -Urine volume < 0.5ml/kg/h for 6 hours  Options provided:  -- Acute kidney failure  -- Acute kidney injury  -- Other - I will add my own diagnosis  -- Disagree - Not applicable / Not valid  -- Disagree - Clinically unable to determine / Unknown  -- Refer to Clinical Documentation Reviewer    PROVIDER RESPONSE TEXT:    This patient has an Acute kidney injury.    Query created by: Xochitl Ca on 3/4/2024 8:45 AM      Electronically signed by:  Luisito Gabriel MD 3/7/2024 3:04 PM

## 2024-03-25 ENCOUNTER — ANESTHESIA (OUTPATIENT)
Facility: HOSPITAL | Age: 74
End: 2024-03-25
Payer: MEDICARE

## 2024-03-25 ENCOUNTER — ANESTHESIA EVENT (OUTPATIENT)
Facility: HOSPITAL | Age: 74
End: 2024-03-25
Payer: MEDICARE

## 2024-03-25 ENCOUNTER — HOSPITAL ENCOUNTER (OUTPATIENT)
Facility: HOSPITAL | Age: 74
Discharge: HOME | End: 2024-03-27
Attending: INTERNAL MEDICINE
Payer: MEDICARE

## 2024-03-25 VITALS
WEIGHT: 190 LBS | RESPIRATION RATE: 18 BRPM | OXYGEN SATURATION: 100 % | HEIGHT: 69 IN | BODY MASS INDEX: 28.14 KG/M2 | HEART RATE: 45 BPM | SYSTOLIC BLOOD PRESSURE: 124 MMHG | DIASTOLIC BLOOD PRESSURE: 72 MMHG | TEMPERATURE: 97.9 F

## 2024-03-25 DIAGNOSIS — I48.91 ATRIAL FIBRILLATION, UNSPECIFIED TYPE (HCC): ICD-10-CM

## 2024-03-25 LAB
ANION GAP SERPL CALC-SCNC: 4 MMOL/L (ref 5–15)
APTT PPP: 31 SEC (ref 21.2–34.1)
BASOPHILS # BLD: 0.1 K/UL (ref 0–0.1)
BASOPHILS NFR BLD: 1 % (ref 0–1)
BUN SERPL-MCNC: 26 MG/DL (ref 6–20)
BUN/CREAT SERPL: 16 (ref 12–20)
CA-I BLD-MCNC: 9.5 MG/DL (ref 8.5–10.1)
CHLORIDE SERPL-SCNC: 107 MMOL/L (ref 97–108)
CO2 SERPL-SCNC: 26 MMOL/L (ref 21–32)
CREAT SERPL-MCNC: 1.63 MG/DL (ref 0.7–1.3)
DIFFERENTIAL METHOD BLD: ABNORMAL
EKG ATRIAL RATE: 131 BPM
EKG ATRIAL RATE: 300 BPM
EKG DIAGNOSIS: NORMAL
EKG DIAGNOSIS: NORMAL
EKG P AXIS: 23 DEGREES
EKG P AXIS: 71 DEGREES
EKG P-R INTERVAL: 192 MS
EKG Q-T INTERVAL: 390 MS
EKG Q-T INTERVAL: 394 MS
EKG QRS DURATION: 82 MS
EKG QRS DURATION: 94 MS
EKG QTC CALCULATION (BAZETT): 439 MS
EKG QTC CALCULATION (BAZETT): 487 MS
EKG R AXIS: 56 DEGREES
EKG R AXIS: 73 DEGREES
EKG T AXIS: 45 DEGREES
EKG T AXIS: 58 DEGREES
EKG VENTRICULAR RATE: 75 BPM
EKG VENTRICULAR RATE: 94 BPM
EOSINOPHIL # BLD: 0.2 K/UL (ref 0–0.4)
EOSINOPHIL NFR BLD: 4 % (ref 0–7)
ERYTHROCYTE [DISTWIDTH] IN BLOOD BY AUTOMATED COUNT: 14.8 % (ref 11.5–14.5)
GLUCOSE SERPL-MCNC: 116 MG/DL (ref 65–100)
HCT VFR BLD AUTO: 46.4 % (ref 36.6–50.3)
HGB BLD-MCNC: 15.2 G/DL (ref 12.1–17)
IMM GRANULOCYTES # BLD AUTO: 0 K/UL (ref 0–0.04)
IMM GRANULOCYTES NFR BLD AUTO: 0 % (ref 0–0.5)
INR PPP: 1.2 (ref 0.9–1.1)
LYMPHOCYTES # BLD: 2 K/UL (ref 0.8–3.5)
LYMPHOCYTES NFR BLD: 49 % (ref 12–49)
MCH RBC QN AUTO: 28.4 PG (ref 26–34)
MCHC RBC AUTO-ENTMCNC: 32.8 G/DL (ref 30–36.5)
MCV RBC AUTO: 86.7 FL (ref 80–99)
MONOCYTES # BLD: 0.3 K/UL (ref 0–1)
MONOCYTES NFR BLD: 8 % (ref 5–13)
NEUTS SEG # BLD: 1.5 K/UL (ref 1.8–8)
NEUTS SEG NFR BLD: 38 % (ref 32–75)
NRBC # BLD: 0 K/UL (ref 0–0.01)
NRBC BLD-RTO: 0 PER 100 WBC
PLATELET # BLD AUTO: 251 K/UL (ref 150–400)
PMV BLD AUTO: 10.7 FL (ref 8.9–12.9)
POTASSIUM SERPL-SCNC: 3.9 MMOL/L (ref 3.5–5.1)
PROTHROMBIN TIME: 15.5 SEC (ref 11.9–14.6)
RBC # BLD AUTO: 5.35 M/UL (ref 4.1–5.7)
SODIUM SERPL-SCNC: 137 MMOL/L (ref 136–145)
THERAPEUTIC RANGE: NORMAL SEC (ref 82–109)
WBC # BLD AUTO: 4.1 K/UL (ref 4.1–11.1)

## 2024-03-25 PROCEDURE — 80048 BASIC METABOLIC PNL TOTAL CA: CPT

## 2024-03-25 PROCEDURE — 85610 PROTHROMBIN TIME: CPT

## 2024-03-25 PROCEDURE — 85025 COMPLETE CBC W/AUTO DIFF WBC: CPT

## 2024-03-25 PROCEDURE — 7100000011 HC PHASE II RECOVERY - ADDTL 15 MIN

## 2024-03-25 PROCEDURE — 3700000001 HC ADD 15 MINUTES (ANESTHESIA)

## 2024-03-25 PROCEDURE — 2500000003 HC RX 250 WO HCPCS: Performed by: NURSE PRACTITIONER

## 2024-03-25 PROCEDURE — 85730 THROMBOPLASTIN TIME PARTIAL: CPT

## 2024-03-25 PROCEDURE — 3700000000 HC ANESTHESIA ATTENDED CARE

## 2024-03-25 PROCEDURE — 7100000010 HC PHASE II RECOVERY - FIRST 15 MIN

## 2024-03-25 PROCEDURE — 93005 ELECTROCARDIOGRAM TRACING: CPT | Performed by: INTERNAL MEDICINE

## 2024-03-25 PROCEDURE — 36415 COLL VENOUS BLD VENIPUNCTURE: CPT

## 2024-03-25 PROCEDURE — 2580000003 HC RX 258: Performed by: INTERNAL MEDICINE

## 2024-03-25 PROCEDURE — 92960 CARDIOVERSION ELECTRIC EXT: CPT

## 2024-03-25 PROCEDURE — 6360000002 HC RX W HCPCS: Performed by: NURSE PRACTITIONER

## 2024-03-25 RX ORDER — EPHEDRINE SULFATE 50 MG/ML
INJECTION INTRAVENOUS
Status: DISPENSED
Start: 2024-03-25 | End: 2024-03-25

## 2024-03-25 RX ORDER — PHENYLEPHRINE HCL IN 0.9% NACL 1 MG/10 ML
SYRINGE (ML) INTRAVENOUS
Status: DISPENSED
Start: 2024-03-25 | End: 2024-03-25

## 2024-03-25 RX ORDER — LIDOCAINE HYDROCHLORIDE 20 MG/ML
INJECTION, SOLUTION EPIDURAL; INFILTRATION; INTRACAUDAL; PERINEURAL
Status: COMPLETED
Start: 2024-03-25 | End: 2024-03-25

## 2024-03-25 RX ORDER — SODIUM CHLORIDE 9 MG/ML
INJECTION, SOLUTION INTRAVENOUS CONTINUOUS
Status: DISCONTINUED | OUTPATIENT
Start: 2024-03-25 | End: 2024-03-28 | Stop reason: HOSPADM

## 2024-03-25 RX ORDER — LIDOCAINE HYDROCHLORIDE 20 MG/ML
INJECTION, SOLUTION EPIDURAL; INFILTRATION; INTRACAUDAL; PERINEURAL PRN
Status: DISCONTINUED | OUTPATIENT
Start: 2024-03-25 | End: 2024-03-25 | Stop reason: SDUPTHER

## 2024-03-25 RX ORDER — PROPOFOL 10 MG/ML
INJECTION, EMULSION INTRAVENOUS
Status: COMPLETED
Start: 2024-03-25 | End: 2024-03-25

## 2024-03-25 RX ADMIN — SODIUM CHLORIDE: 9 INJECTION, SOLUTION INTRAVENOUS at 10:31

## 2024-03-25 RX ADMIN — LIDOCAINE HYDROCHLORIDE 100 MG: 20 INJECTION, SOLUTION EPIDURAL; INFILTRATION; INTRACAUDAL; PERINEURAL at 10:33

## 2024-03-25 RX ADMIN — PROPOFOL 100 MG: 10 INJECTION, EMULSION INTRAVENOUS at 10:35

## 2024-03-25 ASSESSMENT — PAIN - FUNCTIONAL ASSESSMENT
PAIN_FUNCTIONAL_ASSESSMENT: 0-10

## 2024-03-25 ASSESSMENT — LIFESTYLE VARIABLES: SMOKING_STATUS: 1

## 2024-03-25 NOTE — ANESTHESIA PRE PROCEDURE
\"[id number]\", \"BEART\", \"[id number]\"     Type & Screen (If Applicable):  No results found for: \"LABABO\", \"LABRH\"    Drug/Infectious Status (If Applicable):  No results found for: \"HIV\", \"HEPCAB\"    COVID-19 Screening (If Applicable): No results found for: \"COVID19\"        Anesthesia Evaluation  Patient summary reviewed and Nursing notes reviewed  Airway: Mallampati: II  TM distance: >3 FB   Neck ROM: full  Mouth opening: > = 3 FB   Dental:    (+) poor dentition      Pulmonary:normal exam  breath sounds clear to auscultation  (+)           current smoker                           Cardiovascular:    (+) hypertension:, dysrhythmias: atrial fibrillation and atrial flutter      ECG reviewed  Rhythm: irregular  Rate: normal  Echocardiogram reviewed               ROS comment: TTE 2024:  ·  Left Ventricle: Normal left ventricular systolic function with a visually estimated EF of 55 - 60%. Left ventricle size is normal. Mildly increased wall thickness. Findings consistent with concentric hypertrophy. Normal wall motion.  ·  Mitral Valve: Mild regurgitation with multiple jets.  ·  Left Atrium: Left atrium is mildly dilated.  ·  Aorta: Normal sized ascending aorta. Dilated aortic root. Ao root diameter is 4.5 cm.  ·  Image quality is adequate. Technically difficult study due to patient's heart rhythm.       Neuro/Psych:   (+) neuromuscular disease:            GI/Hepatic/Renal:   (+) hiatal hernia, GERD:          Endo/Other:    (+) blood dyscrasia: anticoagulation therapy:..                 Abdominal:             Vascular: negative vascular ROS.         Other Findings:             Anesthesia Plan      MAC and TIVA     ASA 3       Induction: intravenous.  continuous noninvasive hemodynamic monitor    Anesthetic plan and risks discussed with patient.      Plan discussed with CRNA.    Attending anesthesiologist reviewed and agrees with Preprocedure content                Ketan Salazar MD   3/25/2024             Treatment Number (Optional): 4

## 2024-03-25 NOTE — ANESTHESIA POSTPROCEDURE EVALUATION
Department of Anesthesiology  Postprocedure Note    Patient: Reid Nelson  MRN: [id number]  YOB: 1950  Date of evaluation: 3/25/2024    Procedure Summary       Date: 03/25/24 Room / Location: [facility] NON-INVASIVE CARDIOLOGY; [facility] EKG    Anesthesia Start: 1031 Anesthesia Stop: 1047    Procedure: AJIT W/ POSSIBLE CARDIOVERSION (PRN CONTRAST/BUBBLE/3D) Diagnosis:       Atrial fibrillation, unspecified type (HCC)      A-fib (HCC)    Scheduled Providers: Sylvie Burnett MD; Krysta Galvan APRN - CRNA; Ketan Salazar MD Responsible Provider: Ketan Salazar MD    Anesthesia Type: MAC, TIVA ASA Status: 3            Anesthesia Type: No value filed.    Colleen Phase I: Colleen Score: 10    Colleen Phase II:      Anesthesia Post Evaluation    Patient location during evaluation: bedside (Endoscopy Unit)  Patient participation: complete - patient participated  Level of consciousness: sleepy but conscious  Pain score: 0  Airway patency: patent  Nausea & Vomiting: no nausea and no vomiting  Cardiovascular status: hemodynamically stable  Respiratory status: acceptable  Hydration status: stable  Comments: This patient remained on the stretcher.  The patient was handed off to the cardiology nursing team.  All questions regarding pre-, intra-, and postoperative care were answered.  Multimodal analgesia pain management approach    No notable events documented.

## 2024-10-02 ENCOUNTER — TRANSCRIBE ORDERS (OUTPATIENT)
Facility: HOSPITAL | Age: 74
End: 2024-10-02

## 2024-10-02 DIAGNOSIS — Z13.9 ENCOUNTER FOR SCREENING: Primary | ICD-10-CM

## 2024-10-02 DIAGNOSIS — I71.40 ABDOMINAL AORTIC ANEURYSM (AAA) WITHOUT RUPTURE, UNSPECIFIED PART (HCC): ICD-10-CM

## 2024-10-02 DIAGNOSIS — F17.200 TOBACCO USE DISORDER: ICD-10-CM

## 2024-10-15 ENCOUNTER — HOSPITAL ENCOUNTER (OUTPATIENT)
Facility: HOSPITAL | Age: 74
Discharge: HOME | End: 2024-10-18
Attending: INTERNAL MEDICINE
Payer: MEDICARE

## 2024-10-15 DIAGNOSIS — Z13.9 ENCOUNTER FOR SCREENING: ICD-10-CM

## 2024-10-15 DIAGNOSIS — F17.210 CIGARETTE NICOTINE DEPENDENCE WITHOUT COMPLICATION: ICD-10-CM

## 2024-10-15 DIAGNOSIS — F17.200 TOBACCO USE DISORDER: ICD-10-CM

## 2024-10-15 DIAGNOSIS — I71.40 ABDOMINAL AORTIC ANEURYSM (AAA) WITHOUT RUPTURE, UNSPECIFIED PART (HCC): ICD-10-CM

## 2024-10-15 PROCEDURE — 71271 CT THORAX LUNG CANCER SCR C-: CPT

## 2024-10-15 PROCEDURE — 76706 US ABDL AORTA SCREEN AAA: CPT

## 2024-11-25 ENCOUNTER — HOSPITAL ENCOUNTER (OUTPATIENT)
Facility: HOSPITAL | Age: 74
Discharge: HOME | End: 2024-11-28
Attending: INTERNAL MEDICINE
Payer: MEDICARE

## 2024-11-25 DIAGNOSIS — R91.1 LUNG NODULE: ICD-10-CM

## 2024-11-25 PROCEDURE — 78815 PET IMAGE W/CT SKULL-THIGH: CPT

## 2024-11-25 PROCEDURE — A9609 HC RX DIAGNOSTIC RADIOPHARMACEUTICAL: HCPCS | Performed by: INTERNAL MEDICINE

## 2024-11-25 PROCEDURE — 3430000000 HC RX DIAGNOSTIC RADIOPHARMACEUTICAL: Performed by: INTERNAL MEDICINE

## 2024-11-25 RX ORDER — FLUDEOXYGLUCOSE F-18 500 MCI/ML
13.4 INJECTION INTRAVENOUS
Status: COMPLETED | OUTPATIENT
Start: 2024-11-25 | End: 2024-11-25

## 2024-11-25 RX ADMIN — FLUDEOXYGLUCOSE F-18 13.4 MILLICURIE: 500 INJECTION INTRAVENOUS at 15:41

## 2025-02-07 ENCOUNTER — TRANSCRIBE ORDERS (OUTPATIENT)
Facility: HOSPITAL | Age: 75
End: 2025-02-07

## 2025-02-07 DIAGNOSIS — M54.2 NECK PAIN: Primary | ICD-10-CM

## 2025-02-07 DIAGNOSIS — M54.50 LOW BACK PAIN, UNSPECIFIED BACK PAIN LATERALITY, UNSPECIFIED CHRONICITY, UNSPECIFIED WHETHER SCIATICA PRESENT: ICD-10-CM

## 2025-02-28 ENCOUNTER — HOSPITAL ENCOUNTER (OUTPATIENT)
Facility: HOSPITAL | Age: 75
Discharge: HOME | End: 2025-02-28
Payer: MEDICARE

## 2025-02-28 ENCOUNTER — HOSPITAL ENCOUNTER (OUTPATIENT)
Facility: HOSPITAL | Age: 75
End: 2025-02-28
Payer: MEDICARE

## 2025-02-28 DIAGNOSIS — M54.50 LOW BACK PAIN, UNSPECIFIED BACK PAIN LATERALITY, UNSPECIFIED CHRONICITY, UNSPECIFIED WHETHER SCIATICA PRESENT: ICD-10-CM

## 2025-02-28 DIAGNOSIS — M54.2 NECK PAIN: ICD-10-CM

## 2025-02-28 PROCEDURE — 72141 MRI NECK SPINE W/O DYE: CPT

## 2025-02-28 PROCEDURE — 72148 MRI LUMBAR SPINE W/O DYE: CPT

## 2025-08-22 ENCOUNTER — HOSPITAL ENCOUNTER (OUTPATIENT)
Facility: HOSPITAL | Age: 75
Discharge: HOME | End: 2025-08-25
Attending: INTERNAL MEDICINE
Payer: MEDICARE

## 2025-08-22 DIAGNOSIS — R91.1 LUNG NODULE: ICD-10-CM

## 2025-08-22 PROCEDURE — 71250 CT THORAX DX C-: CPT

## (undated) DEVICE — SINGLE-USE BIOPSY FORCEPS: Brand: RADIAL JAW 4

## (undated) DEVICE — CANNULA NASAL ADULT 10FT ETCO2/CO2 VENTFLO

## (undated) DEVICE — CANNULA NSL O2 AD 7 FT END-TIDAL CARBON DIOX VENTFLO

## (undated) DEVICE — MASK O2 MED AD 7 FT 3 IN 1 W/ STD CONN LTX

## (undated) DEVICE — FORCEPS BX 240CM 2.4MM L NDL RAD JAW 4 M00513334

## (undated) DEVICE — BITE BLOCK ENDOSCP AD 60 FR W/ ADJ STRP PLAS GRN BLOX

## (undated) DEVICE — KIT ENDOSCOPIC  PROC VIA

## (undated) DEVICE — LINE SAMPLING ADVANCE ORAL NASAL MICROSTREAM O2 TUBING 6.5'

## (undated) DEVICE — MOUTHPIECE ENDOSCP L CTRL OPN AND SIDE PORTS DISP

## (undated) DEVICE — FORCEPS BX L240CM JAW DIA2.8MM L CAP W/ NDL MIC MESH TOOTH

## (undated) DEVICE — MASK ANES INF SZ 2 PREM TAIL VLV INFL PRT UNSCENTED SGL PT

## (undated) DEVICE — ENDOSCOPIC KIT 1.1+ DE BOWL